# Patient Record
Sex: MALE | Race: BLACK OR AFRICAN AMERICAN
[De-identification: names, ages, dates, MRNs, and addresses within clinical notes are randomized per-mention and may not be internally consistent; named-entity substitution may affect disease eponyms.]

---

## 2017-02-03 NOTE — EMERGENCY ROOM REPORT
History of Present Illness


General


Chief Complaint:  Sore Throat


Source:  Medical Record





Present Illness


HPI


31-year-old male presents emergency department with nasal congestion, sore 

throat which is 5/10 in severity, burning in nature,  and intermittent cough 

x10 days.  Patient states that the cough just began yesterday.  Patient states 

that his nasal congestion is his main symptoms he also reports intermittent 

headaches due to increased pressure in the face.  Patient states has history of 

Asthma and HIV.  Patient states his last viral load was undetectable and he has 

a normal CD4 count.  Patient denies fever she reports chills states he is up-to-

date with vaccinations denies ill contacts or recent travel. Denies CP, 

Palpitations, LOC, AMS, dizziness, Changes in Vision, Sensation, paresthesias, 

or a sudden severe headache.


Allergies:  


Coded Allergies:  


     PENICILLINS (Unverified  Allergy, Unknown, 5/28/15)





Patient History


Past Medical History:  see triage record, HIV


Past Surgical History:  none


Pertinent Family History:  none


Immunizations:  UTD


Reviewed Nursing Documentation:  PMH: Agreed, PSxH: Agreed





Nursing Documentation-PMH


Past Medical History:  No History, Except For


Hx Asthma:  Yes





Review of Systems


All Other Systems:  negative except mentioned in HPI





Physical Exam





Vital Signs








  Date Time  Temp Pulse Resp B/P Pulse Ox O2 Delivery O2 Flow Rate FiO2


 


2/3/17 12:10 98.1 75 18 121/79 99 Room Air  








Sp02 EP Interpretation:  reviewed, normal


General Appearance:  no apparent distress, alert, GCS 15, non-toxic


Head:  normocephalic, atraumatic


Eyes:  bilateral eye PERRL, bilateral eye normal inspection


ENT:  hearing grossly normal, normal pharynx, no angioedema, normal voice, TMs 

+ canals normal, uvula midline, moist mucus membranes, nasal congestion, 

pharyngeal erythema, other - frontal and maxillary sinus TTP


Neck:  full range of motion, supple/symm/no masses


Respiratory:  chest non-tender, lungs clear, normal breath sounds, speaking 

full sentences


Cardiovascular #1:  regular rate, rhythm, no edema


Gastrointestinal:  normal bowel sounds, non tender, soft, no guarding, no 

rebound


Rectal:  deferred


Genitourinary:  normal inspection, no CVA tenderness


Musculoskeletal:  back normal, gait/station normal, normal range of motion, non-

tender, no calf tenderness


Neurologic:  alert, oriented x3, responsive, motor strength/tone normal, 

sensory intact, speech normal


Psychiatric:  judgement/insight normal, memory normal, mood/affect normal, no 

suicidal/homicidal ideation


Skin:  normal color, no rash, warm/dry, well hydrated


Lymphatic:  no adenopathy





Medical Decision Making


PA Attestation


Dr. Babcock  is my supervising Physician whom patient management has been 

discussed with.


Diagnostic Impression:  


 Primary Impression:  


 Acute sinusitis


 Qualified Codes:  J01.10 - Acute frontal sinusitis, unspecified


 Additional Impressions:  


 Acute sinus infection


 Qualified Codes:  J01.10 - Acute frontal sinusitis, unspecified


 Acute sinusitis with symptoms > 10 days


ER Course


**


**!* PT ALLERGIC TO PCN, therefore Amoxicillin was not prescribed due to 

allergy **!**





Pt. presents to the ED c/o nasal congestion, sore throat and intermittent cough 

x10 days.  Patient states that the cough just began yesterday





Ddx considered but are not limited to URI, pneumonia, PE, strep pharyngitis, 

meningitis, Sinusitis.





Vital signs: Pt. is afebrile, the remaining VS are WNL


H&PE are most consistent with  Sinusitis due to symptoms for more than 10 

days.. 





ORDERS: none required at this time, the diagnosis is clinical





ED INTERVENTIONS: None required at this time. 








--PT. EDUCATION: Discussed  rebound congestion with use of Affrin 





DISCHARGE: At this time pt. is stable for d/c to home. Will provide printed 

patient care instructions, and any necessary prescriptions. Care plan and 

follow up instructions have been discussed with the patient prior to discharge.





Last Vital Signs








  Date Time  Temp Pulse Resp B/P Pulse Ox O2 Delivery O2 Flow Rate FiO2


 


2/3/17 12:10 98.1 75 18 121/79 99 Room Air  








Disposition:  HOME, SELF-CARE


Condition:  Stable


Scripts


Albuterol Sulfate* (ALBUTEROL SULFATE MDI*) 8.5 Gm Hfa.aer.ad


2 PUFF INH Q3H, #1 INH 2 Refills


   Prov: Gilma Vargas.ATyesha         2/3/17 


Lidocaine HCl (Lidocaine HCl Viscous) 100 Ml Solution


15 ML PO QID Y for For Pain, #120 ML


   Prov: Gilma Vargas P.ATyesha         2/3/17 


Clindamycin Hcl (CLINDAMYCIN HCL) 300 Mg Capsule


300 MG ORAL TID for 7 Days, #21 CAP


   Prov: Gilma Vargas.A.         2/3/17 


Levofloxacin* (LEVAQUIN*) 500 Mg Tablet


500 MG ORAL DAILY for 5 Days, #5 TAB


   Prov: Gilma Vargas         2/3/17


Referrals:  


NON PHYSICIAN (PCP)


Patient Instructions:  Sinusitis, Adult, Easy-to-Read, Sore Throat





Additional Instructions:  


Take medications as directed. 


Follow up with PCP in 3-5 days 


Return sooner to ED if new symptoms occur, or current symptoms become worse.











Gilma Vargas Feb 3, 2017 12:32

## 2017-02-27 NOTE — EMERGENCY ROOM REPORT
History of Present Illness


General


Chief Complaint:  Pain


Source:  Patient





Present Illness


HPI


The patient is a 31-year-old male presenting with right knee pain which began 4 

months prior.  Patient is unsure of why the pain began and denies any injury to 

the knee.  Pain is described as a 10 out of 10 dull ache it does not radiate 

from the knee.  Pain worse with touch and movement.  Patient has seen his 

primary care doctor who has scheduled an MRI next month.  Patient has not had 

an x-ray done. Pt denies any other symptoms including rash, F, chills, numbness/

tingling


Allergies:  


Coded Allergies:  


     PENICILLINS (Unverified  Allergy, Unknown, 5/28/15)





Patient History


Past Medical History:  see triage record


Pertinent Family History:  none


Reviewed Nursing Documentation:  PMH: Agreed, PSxH: Agreed





Nursing Documentation-PMH


Hx Asthma:  Yes





Review of Systems


All Other Systems:  negative except mentioned in HPI





Physical Exam





Vital Signs








  Date Time  Temp Pulse Resp B/P Pulse Ox O2 Delivery O2 Flow Rate FiO2


 


2/27/17 16:20 98.1 72 16 133/79 100 Room Air  








Sp02 EP Interpretation:  reviewed, normal


General Appearance:  no apparent distress, alert, GCS 15, non-toxic


Head:  normocephalic, atraumatic


Eyes:  bilateral eye PERRL, bilateral eye normal inspection


Musculoskeletal:  normal inspection, gait/station normal, normal range of motion

, tender - TTP over anterior knee


Neurologic:  alert, oriented x3, responsive, motor strength/tone normal, 

sensory intact, speech normal


Psychiatric:  judgement/insight normal, memory normal, mood/affect normal, no 

suicidal/homicidal ideation


Reflexes:  3+ bicep (R), 3+ bicep (L), 3+ tricep (R), 3+ tricep (L), 3+ knee (R)

, 3+ knee (L)


Skin:  normal color, no rash, warm/dry, well hydrated


Lymphatic:  no adenopathy





Procedures


Splinting


Splinting :  


   Consent:  Verbal


   Location:  R knee


   Pre-Made Type:  ACE wrap


   Splint:  knee


   Pre-Proc Neuro Vasc Exam:  normal


   Post-Proc Neuro Vasc Exam:  normal


   Patient Tolerated:  Well


   Complications:  None





Medical Decision Making


PA Attestation


Dr. Membreno is my supervising physician. Patient management was discussed with 

my supervising physician


Diagnostic Impression:  


 Primary Impression:  


 Strain of right knee


ER Course


The patient is a 31-year-old male presenting with right knee pain which began 4 

months prior





Ddx considered include but not limited to sprain/strain, fracture, contusion





PE: vitals WNL. NAD


R knee: Full AROM. No edema. No erythema. No laxity. 


TTP over the anterior joint line. 


SILT





X-ray of the knee is unremarkable.  ACE wrap is placed over the right knee the 

patient is provided crutches.


Pt is given motrin for pain. 


ER precautions given and pt will FU with PMD and have MRI done.


Other X-Ray Diagnostic Results


Other X-Ray Diagnostic Results :  


   X-Ray Ordered:  R knee


   Date:  Feb 27, 2017


   EP Interpretation:  Yes


   Findings:  no fractures, no dislocation, no soft tissue swelling


   Number of Views:  3


   PA Scribe Text


I am acting as scribe for my supervising physician. My supervising physician's 

interpretation of the R knee xrays are there are no fractures, dislocations or 

soft tissue swelling.





Last Vital Signs








  Date Time  Temp Pulse Resp B/P Pulse Ox O2 Delivery O2 Flow Rate FiO2


 


2/27/17 17:11 98.1  16 133/79 100 Room Air  


 


2/27/17 16:20  72      








Status:  improved


Disposition:  HOME, SELF-CARE


Condition:  Improved


Scripts


Ibuprofen* (MOTRIN*) 600 Mg Tablet


600 MG ORAL Q8H Y for For Pain, #30 TAB 0 Refills


   Prov: RIRI ACKERMAN         2/27/17


Referrals:  


[facility] (PCP)


Patient Instructions:  Knee Pain





Additional Instructions:  


I discussed my findings with the patient. All questions and concerns have been 

answered. Treatment and medication compliance have been addressed. I advised 

the patient that they need to follow up with PMD in 3-5 days. Return to ED if 

pain remains or worsens, numbness or tingling occurs, new rash is noticed, 

fever is noticed, or if needed for any reason. Patient verbalized understanding 

of discharge instructions.











RIRI ACKERMAN Feb 27, 2017 19:43

## 2017-02-28 NOTE — DIAGNOSTIC IMAGING REPORT
Indication: Pain



3 views of the right knee were obtained.



Findings:  No acute fracture, malalignment, or joint effusion are identified. Joint

space is relatively well-maintained. Bone mineralization is within normal limits 

for

age.



Impression:  Negative exam

## 2017-04-19 NOTE — EMERGENCY ROOM REPORT
History of Present Illness


General


Chief Complaint:  Diarrhea


Source:  Patient





Present Illness


HPI


31YOM with 3-4 days 3x daily watery diarrhea asssoc with stomach "cramps."  No 

abd pain, fever/chills, nausea/vomiting, sick contacts, foreign travel.  HIV+, 

on HAART. Undetectable viral load.  Been taking VERY high doses of ibuprofen 

for chronic right knee pain - up to 2-3 800mg tablets ibuprofen THREE times a 

day.  Was seen here prior for right knee pain, xray was negative. Pain worse 

when walking, feels it "under knee."  Had MRI last week but doesnt know result 

yet.  Denies trauma to knee, swelling, redness, fever/chills.


Allergies:  


Coded Allergies:  


     PENICILLINS (Unverified  Allergy, Unknown, 5/28/15)





Patient History


Past Medical History:  HIV


Past Surgical History:  none


Pertinent Family History:  none


Social History:  Denies: alcohol use, drug use, smoking


Immunizations:  UTD


Reviewed Nursing Documentation:  PMH: Agreed, PSxH: Agreed





Nursing Documentation-PMH


Past Medical History:  No History, Except For


Hx Asthma:  Yes





Review of Systems


All Other Systems:  negative except mentioned in HPI





Physical Exam





Vital Signs








  Date Time  Temp Pulse Resp B/P Pulse Ox O2 Delivery O2 Flow Rate FiO2


 


4/19/17 08:31 98.2 94 16 119/71 98 Room Air  








Sp02 EP Interpretation:  reviewed, normal


General Appearance:  normal inspection, well appearing, no apparent distress, 

alert, GCS 15, non-toxic


Head:  normocephalic, atraumatic


Eyes:  bilateral eye EOMI, bilateral eye PERRL


ENT:  normal ENT inspection, hearing grossly normal, normal voice


Neck:  normal inspection, full range of motion, supple, no bony tend


Respiratory:  normal inspection, lungs clear, normal breath sounds, no 

respiratory distress, no retraction, no wheezing


Cardiovascular #1:  regular rate, rhythm, no edema


Gastrointestinal:  normal inspection, normal bowel sounds, non tender, soft, no 

guarding, no hernia


Genitourinary:  no CVA tenderness


Musculoskeletal:  normal inspection, back normal, normal range of motion, Walker'

s Sign negative, other - right knee: no swelling, no erythema.  No joint laxity


Neurologic:  normal inspection, alert, oriented x3, responsive, CNs III-XII nml 

as tested, motor strength/tone normal, speech normal


Psychiatric:  normal inspection, judgement/insight normal, mood/affect normal


Skin:  normal inspection





Medical Decision Making


Diagnostic Impression:  


 Primary Impression:  


 Diarrhea


 Qualified Codes:  R19.7 - Diarrhea, unspecified


 Additional Impression:  


 Right knee pain


 Qualified Codes:  M25.561 - Pain in right knee; G89.29 - Other chronic pain


ER Course


Diarrhea and cramps likely gastritis from overdosing of ibuprofen for chronic 

right knee pain


Abd soft, NT/ND - unlikely perforation.


No blood in stool, unlikely upper GI bleed


No sign of septic infection right knee


Atraumatic - no joint laxity


Joint pain possibly related to side effect of antiretrovirals


Advised STOP Ibuprofen


Rx Pepcid for  1 week


Rx norco for knee pain


PMD followup for MRI result, consider switching HAART meds given joint pain





Last Vital Signs








  Date Time  Temp Pulse Resp B/P Pulse Ox O2 Delivery O2 Flow Rate FiO2


 


4/19/17 09:00 98.2 94 16 119/71 98 Room Air  








Status:  improved


Disposition:  HOME, SELF-CARE


Condition:  Improved


Scripts


Famotidine (PEPCID) 20 Mg Tablet


20 MG ORAL BID for 7 Days, #14 TAB 0 Refills


   Prov: LINDSEY CABRALES M.D.         4/19/17 


Hydrocodone Bit/Acetaminophen 5-325* (NORCO 5-325*) 1 Each Tablet


1 TAB ORAL Q8HR Y for knee pain, #20 TAB 0 Refills


   Prov: LINDSEY CARBALES M.D.         4/19/17


Patient Instructions:  Gastritis, Adult





Additional Instructions:  


- STOP ibuprofen


- Take pepcid twice daily for 1 week


- Take Norco as needed for knee pain


- Follow up with your doctor for result of MRI


- Discuss with doctor possibility that your medications are contributing to 

your knee pain











LINDSEY CABRALES M.D. Apr 19, 2017 09:29

## 2017-05-19 NOTE — EMERGENCY ROOM REPORT
History of Present Illness


General


Chief Complaint:  Flu Like Symptoms


Source:  Patient





Present Illness


HPI


Patient present with complaints of sore throat


Ongoing for the past 2 days


Increased pain with swallowing


Denies any fevers





Pain is 4/10 sharp





Denies any posterior neck pain denies any headache or visual changes denies any 

chest pain or shortness of breath he also has a mild runny nose





Patient appeared to be limping in triage questions further patient reported 

that he had chronic right knee pain for which she was getting worked up as an 

outpatient, and has had MRI


Allergies:  


Coded Allergies:  


     PENICILLINS (Unverified  Allergy, Unknown, 5/28/15)





Patient History


Past Medical History:  see triage record


Pertinent Family History:  none


Reviewed Nursing Documentation:  PMH: Agreed, PSxH: Agreed





Nursing Documentation-PMH


Hx Cardiac Problems:  No


Hx Hypertension:  No


Hx Pacemaker:  No


Hx Asthma:  Yes


Hx COPD:  No


Hx Diabetes:  No


Hx Cancer:  No


Hx Gastrointestinal Problems:  No


Hx Dialysis:  No


History Of Psychiatric Problem:  No


Hx Cerebrovascular Accident:  No


Hx Seizures:  No





Review of Systems


All Other Systems:  negative except mentioned in HPI





Physical Exam





Vital Signs








  Date Time  Temp Pulse Resp B/P Pulse Ox O2 Delivery O2 Flow Rate FiO2


 


5/18/17 07:39 98.1 78 16 120/80 99 Room Air  








Sp02 EP Interpretation:  reviewed, normal


General Appearance:  well appearing, no apparent distress


Head:  normocephalic, atraumatic


Eyes:  bilateral eye EOMI, bilateral eye PERRL


ENT:  no angioedema, normal voice, pharyngeal erythema


Neck:  supple, thyroid normal, no meningismus


Respiratory:  lungs clear, normal breath sounds


Cardiovascular #1:  regular rate, rhythm


Gastrointestinal:  soft, no mass


Neurologic:  alert, oriented x3, responsive


Skin:  no rash


Lymphatic:  no adenopathy





Medical Decision Making


Diagnostic Impression:  


 Primary Impression:  


 Pharyngitis


ER Course


Patient has clinical findings the pharyngitis


Was placed on oral antibiotics





Patient's right knee discomfort is not further addressed and has been worked up 

as an outpatient





Last Vital Signs








  Date Time  Temp Pulse Resp B/P Pulse Ox O2 Delivery O2 Flow Rate FiO2


 


5/18/17 07:55 98.1  16 120/80 99 Room Air  


 


5/18/17 07:45  78      








Status:  unchanged


Disposition:  HOME, SELF-CARE


Condition:  Stable


Scripts


Ibuprofen* (MOTRIN*) 600 Mg Tablet


600 MG ORAL Q8H Y for For Pain, #20 TAB 0 Refills


   Prov: JAMEHDOR,ALI D.O.         5/18/17 


Azithromycin* (ZITHROMAX*) 250 Mg Tablet


500 MG ORAL ONCE for 7 Days, TAB 0 Refills


   Prov: UJVENAL GARCIA D.O.         5/18/17


Referrals:  


[facility] (PCP)


Patient Instructions:  Pharyngitis, Easy-to-Read





Additional Instructions:  


Patient is provided with the discharge instructions notified to follow up with 

primary doctor in the next 2-3 days otherwise return to the er with any 

worsening symptoms.


Please note that this report is being documented using [employer] technology.  This 

can lead to erroneous entry secondary to incorrect interpretation by the 

dictating instrument.











JUVENAL GARCIA D.O. May 19, 2017 07:17

## 2017-07-23 NOTE — EMERGENCY ROOM REPORT
History of Present Illness


General


Chief Complaint:  Sore Throat


Source:  Patient





Present Illness


HPI


32 YO Male presents to the ED c/o : sore throat, tonsillar swelling, and nasal 

congestion x  1week.  denies fevers or cough. denies neck pain or stiffness. 

pain is 9/10 in severity exacerbated with swallowing, described as razor blades 

on the back of the throat. pt. has been doing warm salt gargles with no relief.

  denies rashes, abdominal pain, recent travel or ill contacts. Denies CP, 

Palpitations, LOC, AMS, dizziness, Changes in Vision, Sensation, paresthesias, 

or a sudden severe headache.


Allergies:  


Coded Allergies:  


     PENICILLINS (Unverified  Allergy, Unknown, 5/28/15)





Patient History


Past Medical History:  see triage record


Past Surgical History:  none


Pertinent Family History:  none


Immunizations:  UTD


Reviewed Nursing Documentation:  PMH: Agreed, PSxH: Agreed





Nursing Documentation-PMH


Hx Hypertension:  No


Hx Pacemaker:  No


Hx Asthma:  Yes


Hx COPD:  No


Hx Diabetes:  No


Hx Cancer:  No


Hx Gastrointestinal Problems:  No


Hx Dialysis:  No


Hx Cerebrovascular Accident:  No


Hx Seizures:  No





Review of Systems


All Other Systems:  negative except mentioned in HPI





Physical Exam





Vital Signs








  Date Time  Temp Pulse Resp B/P Pulse Ox O2 Delivery O2 Flow Rate FiO2


 


7/23/17 12:28 97.5 95 16 128/67 97 Room Air  








Sp02 EP Interpretation:  reviewed, normal


General Appearance:  no apparent distress, alert, GCS 15, non-toxic


Head:  normocephalic, atraumatic


Eyes:  bilateral eye PERRL, bilateral eye normal inspection


ENT:  hearing grossly normal, normal pharynx, no angioedema, normal voice, TMs 

+ canals normal, uvula midline, nasal congestion, tonsillar swelling, 

pharyngeal erythema


Neck:  full range of motion, no meningismus, no bony tend, supple/symm/no masses


Respiratory:  lungs clear, normal breath sounds, speaking full sentences


Cardiovascular #1:  regular rate, rhythm, no edema


Rectal:  deferred


Musculoskeletal:  back normal, gait/station normal, normal range of motion, non-

tender


Neurologic:  alert, oriented x3, responsive, motor strength/tone normal, 

sensory intact, speech normal


Psychiatric:  judgement/insight normal, memory normal, mood/affect normal


Skin:  normal color, no rash, warm/dry, well hydrated


Lymphatic:  no adenopathy





Medical Decision Making


PA Attestation


Dr. Babcock  is my supervising Physician whom patient management has been 

discussed with.


Diagnostic Impression:  


 Primary Impression:  


 Pharyngitis, acute


 Qualified Codes:  J02.9 - Acute pharyngitis, unspecified


ER Course


Pt. presents to the ED c/o : sore throat, tonsillar swelling, and nasal 

congestion x  1week.  denies fevers or cough. denies neck pain or stiffness.  


Ddx considered but are not limited to: pharyngitis, strep, PTA, ludwigs angina, 

URI 


Vital signs: are WNL, pt. is afebrile 


H&PE are most consistent with: pharyngitis presumed strep. 


ORDERS: None required at this time as the diagnosis is clinical 


ED INTERVENTIONS: none required at this time. 


DISCHARGE: At this time pt. is stable for d/c to home. Will provide printed 

patient care instructions, and any necessary prescriptions. Care plan and 

follow up instructions have been discussed with the patient prior to discharge.





Last Vital Signs








  Date Time  Temp Pulse Resp B/P Pulse Ox O2 Delivery O2 Flow Rate FiO2


 


7/23/17 12:28 97.5 95 16 128/67 97 Room Air  








Disposition:  HOME, SELF-CARE


Condition:  Stable


Scripts


Acetaminophen* (TYLENOL EXTRA STRENGTH*) 500 Mg Tablet


500 MG ORAL Q6H, #20 TAB 0 Refills


   Prov: Gilma Vargas         7/23/17 


Pseudoephedrine Hcl* (NEXAFED*) 30 Mg Tablet


30 MG ORAL Q6H Y for congestion, #20 TAB


   Prov: Gilma Vargas.ATyesha         7/23/17 


Azithromycin* (ZITHROMAX*) 250 Mg Tablet


250 MG ORAL DAILY for 6 Days, #6 TAB 0 Refills


   Prov: Gilma VargasATyesha         7/23/17 


Lidocaine HCl 2% Viscous (Lidocaine HCl 2% Viscous) 100 Ml Solution


15 ML ORAL QID, #118 ML


   Prov: Gilma Vargas         7/23/17


Patient Instructions:  Pharyngitis, Easy-to-Read





Additional Instructions:  


Take medications as directed. 


 ** Follow up with a Primary Care Provider in 3-5 days, even if your symptoms 

have resolved. ** 


--Please review list of primary care clinics, if you do not already have a 

primary care provider





Return sooner to ED if new symptoms occur, or current symptoms become worse. 








- Please note that this Emergency Department Report was dictated using [employer] technology software, occasionally this can lead to 

erroneous entry secondary to interpretation by the dictation equipment.











Gilma Vargas Jul 23, 2017 12:59

## 2017-09-07 NOTE — EMERGENCY ROOM REPORT
History of Present Illness


General


Chief Complaint:  Abdominal Pain


Source:  Patient, Medical Record





Present Illness


HPI


32 yo male with no sig pmhx p/w one week of nausea vomiting and diarrhea


Patient states vomiting started 6 days ago which has since subsided for the 

last 3 days, 


Patient complains of mild abdominal pain generalized, non radiating, crampy in 

nature, intermittent. No relieving or exacerbating factors. Severity is mild.


Patient also complains of ~3-4 episodes of watery non bloody diarrhea for the 

past 2 days


Denies fever, chills. 


No hx of abdominal surgeries.


No hx of endoscopies/colonoscopies.


Denies any recent antibiotic use, any recent travel or sick contacts


Allergies:  


Coded Allergies:  


     PENICILLINS (Unverified  Allergy, Unknown, 5/28/15)





Patient History


Past Medical History:  see triage record


Past Surgical History:  none


Pertinent Family History:  none


Reviewed Nursing Documentation:  PMH: Agreed, PSxH: Agreed





Nursing Documentation-PMH


Past Medical History:  No History, Except For


Hx Hypertension:  No


Hx Pacemaker:  No


Hx Asthma:  Yes


Hx COPD:  No


Hx Diabetes:  No


Hx Cancer:  No


Hx Gastrointestinal Problems:  No


Hx Dialysis:  No


History Of Psychiatric Problem:  Yes - Depression


Hx Cerebrovascular Accident:  No


Hx Seizures:  No





Review of Systems


All Other Systems:  negative except mentioned in HPI





Physical Exam





Vital Signs








  Date Time  Temp Pulse Resp B/P (MAP) Pulse Ox O2 Delivery O2 Flow Rate FiO2


 


9/7/17 09:53 97.7 81 14 120/78 99 Room Air  








Sp02 EP Interpretation:  reviewed, normal


General Appearance:  normal inspection, well appearing, no apparent distress, 

alert, GCS 15, non-toxic, other - Well-hydrated, not in distress, conversing 

appropriately


Head:  normocephalic, atraumatic


Eyes:  bilateral eye normal inspection, bilateral eye PERRL, bilateral eye EOMI


ENT:  normal ENT inspection, normal pharynx, normal voice, moist mucus membranes


Neck:  normal inspection, full range of motion, supple


Respiratory:  normal inspection, lungs clear, normal breath sounds, no 

respiratory distress, no retraction, no wheezing, speaking full sentences, 

chest symmetrical


Cardiovascular #1:  normal inspection, regular rate, rhythm, no edema, normal 

capillary refill


Cardiovascular #2:  2+ radial (R), 2+ radial (L)


Gastrointestinal:  normal inspection, non tender, soft, non-distended, no 

guarding


Genitourinary:  no CVA tenderness


Musculoskeletal:  normal inspection, back normal, normal range of motion, non-

tender


Neurologic:  normal inspection, alert, oriented x3, responsive, motor strength/

tone normal, sensory intact, normal gait, speech normal


Psychiatric:  normal inspection, judgement/insight normal, memory normal


Skin:  normal inspection, normal color, no rash, warm/dry, well hydrated, 

normal turgor





Medical Decision Making


Diagnostic Impression:  


 Primary Impression:  


 Nausea, vomiting, and diarrhea


ER Course


32 yo male with nausea and vomiting which has now resolved now with diarrhea


Differential Diagnosis:


Gastritis, gastroenteritis, cholecystitis, appendicitis, diverticulitis, SBO, 

mesenteric ischemia, cardiac, UTI/pyelo


At this time abdomen is soft nontender, not likely to have acute intra-

abdominal surgical pathology, will hold CT for now. 


due to history and physical exam gastroenteritis is most likely in this patient





Plan:


Basic labs, ua, Zofran and IV fluids








ER course:


Patient has remained stable during ED stay.


No episodes of nausea vomiting or diarrhea.  Repeat abdominal exam is benign.





Disposition:


Patient is to be discharged to home.


Patient is instructed to follow up with their primary care doctor within 5 

days. 


Strict return precautions discussed with patient such as fever, chills, 

worsening/severe pain, nausea, vomiting, which may indicate severe illness. 

Patient verbalizes understanding and agrees with plan. 





Please note that this Emergency Department Report was dictated using [employer] technology software, occasionally this can lead to 

erroneous entry secondary to interpretation by the dictation equipment





Laboratory Tests








Test


  9/7/17


10:26 9/7/17


10:30


 


Urine Color Pale yellow   


 


Urine Appearance Clear   


 


Urine pH 8 (4.5-8.0)   


 


Urine Specific Gravity


  1.010


(1.005-1.035) 


 


 


Urine Protein


  Negative


(NEGATIVE) 


 


 


Urine Glucose (UA)


  Negative


(NEGATIVE) 


 


 


Urine Ketones


  Negative


(NEGATIVE) 


 


 


Urine Occult Blood


  Negative


(NEGATIVE) 


 


 


Urine Nitrite


  Negative


(NEGATIVE) 


 


 


Urine Bilirubin


  Negative


(NEGATIVE) 


 


 


Urine Urobilinogen


  Normal MG/DL


(0.0-1.0) 


 


 


Urine Leukocyte Esterase


  Negative


(NEGATIVE) 


 


 


White Blood Count


  


  5.5 K/UL


(4.8-10.8)


 


Red Blood Count


  


  5.16 M/UL


(4.70-6.10)


 


Hemoglobin


  


  14.5 G/DL


(14.2-18.0)


 


Hematocrit


  


  45.6 %


(42.0-52.0)


 


Mean Corpuscular Volume  88 FL (80-99)  


 


Mean Corpuscular Hemoglobin


  


  28.1 PG


(27.0-31.0)


 


Mean Corpuscular Hemoglobin


Concent 


  31.8 G/DL


(32.0-36.0)  L


 


Red Cell Distribution Width


  


  12.5 %


(11.6-14.8)


 


Platelet Count


  


  151 K/UL


(150-450)


 


Mean Platelet Volume


  


  10.3 FL


(6.5-10.1)  H


 


Neutrophils (%) (Auto)


  


  69.3 %


(45.0-75.0)


 


Lymphocytes (%) (Auto)


  


  21.5 %


(20.0-45.0)


 


Monocytes (%) (Auto)


  


  8.4 %


(1.0-10.0)


 


Eosinophils (%) (Auto)


  


  0.0 %


(0.0-3.0)


 


Basophils (%) (Auto)


  


  0.9 %


(0.0-2.0)


 


Sodium Level


  


  137 mEQ/L


(135-145)


 


Potassium Level


  


  3.9 mEQ/L


(3.4-4.9)


 


Chloride Level


  


  101 mEQ/L


()


 


Carbon Dioxide Level


  


  26 mEQ/L


(20-30)


 


Anion Gap  10 (5-15)  


 


Blood Urea Nitrogen


  


  10 mg/dL


(7-23)


 


Creatinine


  


  0.8 mg/dL


(0.7-1.2)


 


Estimate Glomerular


Filtration Rate 


  > 60 mL/min


(>60)


 


Glucose Level


  


  97 mg/dL


()


 


Calcium Level


  


  9.5 mg/dL


(8.6-10.2)


 


Total Bilirubin


  


  0.3 mg/dL


(0.0-1.2)


 


Aspartate Amino Transferase


(AST) 


  16 U/L (5-40)  


 


 


Alanine Aminotransferase (ALT)  13 U/L (3-41)  


 


Alkaline Phosphatase


  


  61 U/L


()


 


Total Protein


  


  7.4 g/dL


(6.6-8.7)


 


Albumin


  


  4.3 g/dL


(3.5-5.2)


 


Globulin  3.1 g/dL  


 


Albumin/Globulin Ratio  1.3 (1.0-2.7)  


 


Lipase  21 U/L (< 60)  











Last Vital Signs








  Date Time  Temp Pulse Resp B/P (MAP) Pulse Ox O2 Delivery O2 Flow Rate FiO2


 


9/7/17 12:23 97.2 77 16 123/87 99 Room Air  








Disposition:  HOME, SELF-CARE


Condition:  Improved


Departure Forms:  Return to Work   Return to Work in (Days):  2


Patient Instructions:  Viral Gastroenteritis, Adult





Additional Instructions:  


Please follow up with your primary care doctor within 3 days. 





Please come back to the emergency room if you are having worsening abdominal 

pain, chest pain, shortness of breath, intractable nausea or vomiting











Fan Ragland M.D. Sep 7, 2017 14:57

## 2017-09-28 NOTE — EMERGENCY ROOM REPORT
History of Present Illness


General


Chief Complaint:  General Complaint


Source:  Patient





Present Illness


HPI


 33 YO Male presents to the ED c/o Sore throat with 9/10 in severity painful 

Oral lesions on his inner lip, roof of the mouth, and inner cheeks x 3 days. 

Denies fevers, chills, cough, neck pain or stiffness. Denies HA. Pt. is UTD 

with vaccinations, denies ill contacts or recent travel.  Pt has hx of HIV , 

takes anti-retrovirals. Denies swelling of the lips or tongue, SOB, difficulty 

breathing, rash, or lesions elsewhere on the body. denies blisters or new 

medications.Denies CP, Palpitations, LOC, AMS, dizziness, Changes in Vision, 

Sensation, paresthesias, or a sudden severe headache.


Allergies:  


Coded Allergies:  


     PENICILLINS (Unverified  Allergy, Unknown, 5/28/15)





Patient History


Past Medical History:  see triage record


Past Surgical History:  none


Pertinent Family History:  none


Immunizations:  UTD


Reviewed Nursing Documentation:  PMH: Agreed, PSxH: Agreed





Nursing Documentation-PMH


Past Medical History:  No History, Except For


Hx Hypertension:  No


Hx Pacemaker:  No


Hx Asthma:  Yes


Hx COPD:  No


Hx Diabetes:  No


Hx Cancer:  No


Hx Gastrointestinal Problems:  No


Hx Dialysis:  No


Hx Cerebrovascular Accident:  No


Hx Seizures:  No





Review of Systems


All Other Systems:  negative except mentioned in HPI





Physical Exam





Vital Signs








  Date Time  Temp Pulse Resp B/P (MAP) Pulse Ox O2 Delivery O2 Flow Rate FiO2


 


9/28/17 18:20 97.7 86 18 140/91 99 Room Air  








Sp02 EP Interpretation:  reviewed, normal


General Appearance:  no apparent distress, alert, GCS 15, non-toxic


Head:  normocephalic, atraumatic


Eyes:  bilateral eye normal inspection, bilateral eye PERRL


ENT:  hearing grossly normal, normal pharynx, no angioedema, normal voice, TMs 

+ canals normal, uvula midline, moist mucus membranes, other - superficial 

ulcers noted on the roof of the mouth, inner lower lip, and bilateral buccal 

surfaces of the cheeks. no tonsillar swelling, pharyngeal erythema or exudates.


Neck:  full range of motion, no meningismus


Respiratory:  lungs clear, normal breath sounds, speaking full sentences


Cardiovascular #1:  regular rate, rhythm, normal capillary refill


Rectal:  deferred


Genitourinary:  normal inspection, no CVA tenderness


Musculoskeletal:  back normal, gait/station normal, normal range of motion, non-

tender


Neurologic:  alert, oriented x3, responsive, motor strength/tone normal, 

sensory intact, speech normal


Psychiatric:  judgement/insight normal, memory normal, mood/affect normal


Skin:  normal color, no rash, warm/dry, well hydrated


Lymphatic:  no adenopathy





Medical Decision Making


PA Attestation


Dr. Levi  is my supervising Physician whom patient management has been 

discussed with.


Diagnostic Impression:  


 Primary Impression:  


 Viral sore throat


 Additional Impression:  


 Canker sores oral


ER Course


 33 YO Male presents to the ED c/o Sore throat with 9/10 in severity painful 

Oral lesions on his inner lip, roof of the mouth, and inner cheeks x 3 days. 

Denies fevers, chills, cough, neck pain or stiffness. Denies HA. Pt. is UTD 

with vaccinations, denies ill contacts or recent travel.  Pt has hx of HIV , 

takes anti-retrovirals. Denies swelling of the lips or tongue, SOB, difficulty 

breathing, rash, or lesions elsewhere on the body. denies blisters or new 

medications.Denies CP, Palpitations, LOC, AMS, dizziness, Changes in Vision, 

Sensation, paresthesias, or a sudden severe headache.





Ddx considered but are not limited to: pharyngitis, strep, PTA, ludwigs angina, 

URI, candidiasis, hair leukoplakia just to name a few. 


Vital signs: are WNL, pt. is afebrile 


H&PE are most consistent with: canker sores most likely of viral etiology. 





ORDERS: None required at this time as the diagnosis is clinical 





ED INTERVENTIONS: none required at this time. 





DISCHARGE: At this time pt. is stable for d/c to home. Will provide printed 

patient care instructions, and any necessary prescriptions. Care plan and 

follow up instructions have been discussed with the patient prior to discharge.





Last Vital Signs








  Date Time  Temp Pulse Resp B/P (MAP) Pulse Ox O2 Delivery O2 Flow Rate FiO2


 


9/28/17 18:54 97.7  18 140/91 99 Room Air  


 


9/28/17 18:20  86      








Disposition:  HOME, SELF-CARE


Condition:  Stable


Scripts


Acyclovir* (ZOVIRAX*) 800 Mg Tablet


800 MG ORAL THREE TIMES A DAY for 5 Days, #15 CAP 0 Refills


   Prov: Gilma Vargas         9/28/17 


Lidocaine HCl 2% Viscous (Lidocaine HCl 2% Viscous) 100 Ml Solution


10 ML ORAL QID, #118 ML


   Prov: Gilma Vargas         9/28/17


Patient Instructions:  Canker Sores, Pharyngitis, Easy-to-Read





Additional Instructions:  


Take medications as directed. 


 ** Follow up with a Primary Care Provider in 3-5 days, even if your symptoms 

have resolved. ** 


--Please review list of primary care clinics, if you do not already have a 

primary care provider





Return sooner to ED if new symptoms occur, or current symptoms become worse. 








- Please note that this Emergency Department Report was dictated using [employer] technology software, occasionally this can lead to 

erroneous entry secondary to interpretation by the dictation equipment.











Gilma Vargas Sep 28, 2017 19:02

## 2017-10-27 NOTE — EMERGENCY ROOM REPORT
History of Present Illness


General


Chief Complaint:  Diarrhea


Source:  Patient





Present Illness


HPI


32-year-old male history of asthma presenting with diarrhea for 4 days


Denies abdominal pain


Denies fever chills, about 3-4 episodes of watery nonbloody diarrhea today


No hx of abdominal surgeries.


No hx of endoscopies/colonoscopies.


Patient states that this has happened in the past, no recent travel no sick 

contacts no recent antibiotic use


Allergies:  


Coded Allergies:  


     PENICILLINS (Unverified  Allergy, Unknown, 5/28/15)





Patient History


Past Medical History:  see triage record


Past Surgical History:  none


Pertinent Family History:  none


Reviewed Nursing Documentation:  PMH: Agreed, PSxH: Agreed





Nursing Documentation-PMH


Hx Hypertension:  No


Hx Pacemaker:  No


Hx Asthma:  Yes


Hx COPD:  No


Hx Diabetes:  No


Hx Cancer:  No


Hx Gastrointestinal Problems:  No


Hx Dialysis:  No


Hx Cerebrovascular Accident:  No


Hx Seizures:  No





Review of Systems


All Other Systems:  negative except mentioned in HPI





Physical Exam





Vital Signs








  Date Time  Temp Pulse Resp B/P (MAP) Pulse Ox O2 Delivery O2 Flow Rate FiO2


 


10/27/17 03:17 98.1 84 16 134/75 98 Room Air  








Sp02 EP Interpretation:  reviewed, normal


General Appearance:  normal inspection, well appearing, no apparent distress, 

alert, GCS 15, non-toxic


Head:  normocephalic, atraumatic


Eyes:  bilateral eye normal inspection, bilateral eye PERRL, bilateral eye EOMI


ENT:  normal ENT inspection, normal pharynx, normal voice, moist mucus membranes


Neck:  normal inspection, full range of motion, supple


Respiratory:  normal inspection, lungs clear, normal breath sounds, no 

respiratory distress, no retraction, no wheezing, speaking full sentences, 

chest symmetrical


Cardiovascular #1:  normal inspection, regular rate, rhythm, no edema, normal 

capillary refill


Cardiovascular #2:  2+ radial (R), 2+ radial (L)


Gastrointestinal:  normal inspection, non tender, soft, non-distended, no 

guarding


Genitourinary:  no CVA tenderness


Musculoskeletal:  normal inspection, back normal, normal range of motion, non-

tender


Neurologic:  normal inspection, alert, oriented x3, responsive, motor strength/

tone normal, sensory intact, normal gait, speech normal


Psychiatric:  normal inspection, judgement/insight normal, memory normal


Skin:  normal inspection, normal color, no rash, warm/dry, well hydrated, 

normal turgor





Medical Decision Making


Diagnostic Impression:  


 Primary Impression:  


 Diarrhea


ER Course


32-year-old male with diarrhea for 4 days





Differential Diagnosis:


Viral gastroenteritis, at this time abdomen is very soft nontender





Plan:


None emergency room, patient appears nontoxic, is able to tolerate by mouth, 

not dehydrated





ER course:


Patient has remained stable during ED stay.








Disposition:


Patient is to be discharged to home.


Patient is instructed to follow up with their primary care doctor within 5 

days. 


Strict return precautions discussed with patient such as fever, chills, 

worsening/severe abdominal pain, nausea, vomiting, black or bloody stools, 

which may indicate severe illness. Patient verbalizes understanding and agrees 

with plan. 





Please note that this Emergency Department Report was dictated using [employer] technology software, occasionally this can lead to 

erroneous entry secondary to interpretation by the dictation equipment





Last Vital Signs








  Date Time  Temp Pulse Resp B/P (MAP) Pulse Ox O2 Delivery O2 Flow Rate FiO2


 


10/27/17 03:20 98.1 84 16 134/75 98 Room Air  








Disposition:  HOME, SELF-CARE


Condition:  Stable


Scripts


Albuterol Sulfate* (ALBUTEROL SULFATE MDI*) 8.5 Gm Hfa.aer.ad


2 PUFF INH Q4H Y for cough/wheezing, #1 EA 0 Refills


   Prov: Fan Ragland M.D.         10/27/17


Patient Instructions:  Diarrhea, Adult











Fan Ragland M.D. Oct 27, 2017 04:06

## 2017-10-30 NOTE — EMERGENCY ROOM REPORT
History of Present Illness


General


Chief Complaint:  Nausea, Vomiting, and Diarrhea


Source:  Patient





Present Illness


HPI


32-year-old male presents ED for evaluation.  Patient states her approximately 

one week now is having cramping abdominal pain with diarrhea.  Patient was seen 

here 4 days ago and was given reassurance that this is viral and was subsequent 

discharge.  Patient states the symptoms have persisted and have gotten worse.  

Notes bloody diarrhea now. pain is cramping, 10/10, nonradiating. Notes some 

episodes of fevers and chills.  Afebrile in triage.  Patient notes history of 

HIV and is currently on medication.  Denies sick contacts or recent travel.  No 

other aggravating relieving factors.  Denies any other associated symptoms


Allergies:  


Coded Allergies:  


     PENICILLINS (Unverified  Allergy, Unknown, 5/28/15)





Patient History


Past Medical History:  asthma


Past Surgical History:  none


Pertinent Family History:  none


Social History:  Denies: smoking, alcohol use, drug use


Immunizations:  UTD


Reviewed Nursing Documentation:  PMH: Agreed, PSxH: Agreed





Nursing Documentation-PMH


Past Medical History:  No History, Except For


Hx Hypertension:  No


Hx Pacemaker:  No


Hx Asthma:  Yes


Hx COPD:  No


Hx Diabetes:  No


Hx Cancer:  No - HIV positive


Hx Gastrointestinal Problems:  No


Hx Dialysis:  No


Hx Cerebrovascular Accident:  No


Hx Seizures:  No





Review of Systems


All Other Systems:  negative except mentioned in HPI





Physical Exam





Vital Signs








  Date Time  Temp Pulse Resp B/P (MAP) Pulse Ox O2 Delivery O2 Flow Rate FiO2


 


10/30/17 07:25 98.4 117 20 105/68 95 Room Air  








Sp02 EP Interpretation:  reviewed, normal


General Appearance:  no apparent distress, alert, GCS 15, non-toxic


Head:  normocephalic, atraumatic


Eyes:  bilateral eye normal inspection, bilateral eye PERRL


ENT:  hearing grossly normal, normal pharynx, no angioedema, normal voice


Neck:  full range of motion, supple/symm/no masses


Respiratory:  chest non-tender, lungs clear, normal breath sounds, speaking 

full sentences


Cardiovascular #1:  regular rate, rhythm, no edema


Cardiovascular #2:  2+ carotid (R), 2+ carotid (L), 2+ radial (R), 2+ radial (L)

, 2+ dorsalis pedis (R), 2+ dorsalis pedis (L)


Gastrointestinal:  normal bowel sounds, non tender, soft, non-distended, no 

guarding, no rebound


Rectal:  deferred


Genitourinary:  normal inspection, no CVA tenderness


Musculoskeletal:  back normal, gait/station normal, normal range of motion, non-

tender


Neurologic:  alert, oriented x3, responsive, motor strength/tone normal, 

sensory intact, speech normal


Psychiatric:  judgement/insight normal, memory normal, mood/affect normal, no 

suicidal/homicidal ideation


Reflexes:  3+ bicep (R), 3+ bicep (L), 3+ tricep (R), 3+ tricep (L), 3+ knee (R)

, 3+ knee (L)


Skin:  normal color, no rash, warm/dry, well hydrated


Lymphatic:  no adenopathy





Medical Decision Making


Diagnostic Impression:  


 Primary Impression:  


 Colitis


ER Course


Hospital Course 


32-year-old M presents to ED with cramping abdominal pain with vomiting, 

diarrhea 





differential diagnosis: gastritis, SBO, cholecystits, gastroenteritis  





Clinical course


Patient placed on stretcher.  On cardiac monitor.  After initial history and 

physical I ordered labs, IV fluids, Zofran, GI cocktail, pepcid





Labs - no leukocytosis, electrolytes ok, LFTs normal





Upon reassessment, patient states pain has improved.  Given bloody diarrhea, 

persistence of symptoms and history of immunosuppression we will prescribe 

antibiotics





I feel this is a highly complex case requiring extensive working including EKG/

Rhythm strip, Xray/CT/US, Blood/urine lab work, repeat exams while in ED, and 

administration of strong opiates/narcotics for pain control, admission to 

hospital or close patient follow up.  





Diagnosis - colitis





Stable and discharged to home with prescriptions for Zantac, bentyl, flagyl, 

cipro.  Followup with PMD.  Return to ED if symptoms recur or worsen





Labs








Test


  10/30/17


07:50


 


White Blood Count


  8.3 K/UL


(4.8-10.8)


 


Red Blood Count


  5.66 M/UL


(4.70-6.10)


 


Hemoglobin


  16.4 G/DL


(14.2-18.0)


 


Hematocrit


  49.7 %


(42.0-52.0)


 


Mean Corpuscular Volume 88 FL (80-99) 


 


Mean Corpuscular Hemoglobin


  28.9 PG


(27.0-31.0)


 


Mean Corpuscular Hemoglobin


Concent 32.9 G/DL


(32.0-36.0)


 


Red Cell Distribution Width


  13.3 %


(11.6-14.8)


 


Platelet Count


  144 K/UL


(150-450)


 


Mean Platelet Volume


  9.6 FL


(6.5-10.1)


 


Neutrophils (%) (Auto)


  58.6 %


(45.0-75.0)


 


Lymphocytes (%) (Auto)


  26.8 %


(20.0-45.0)


 


Monocytes (%) (Auto)


  11.5 %


(1.0-10.0)


 


Eosinophils (%) (Auto)


  1.2 %


(0.0-3.0)


 


Basophils (%) (Auto)


  1.9 %


(0.0-2.0)


 


Sodium Level


  137 MMOL/L


(136-145)


 


Potassium Level


  3.7 MMOL/L


(3.5-5.1)


 


Chloride Level


  101 MMOL/L


()


 


Carbon Dioxide Level


  23 MMOL/L


(21-32)


 


Anion Gap


  13 mmol/L


(5-15)


 


Blood Urea Nitrogen


  15 mg/dL


(7-18)


 


Creatinine


  1.0 MG/DL


(0.55-1.30)


 


Estimat Glomerular Filtration


Rate > 60 mL/min


(>60)


 


Glucose Level


  108 MG/DL


()


 


Calcium Level


  9.6 MG/DL


(8.5-10.1)


 


Total Bilirubin


  0.6 MG/DL


(0.2-1.0)


 


Aspartate Amino Transf


(AST/SGOT) 22 U/L (15-37) 


 


 


Alanine Aminotransferase


(ALT/SGPT) 26 U/L (12-78) 


 


 


Alkaline Phosphatase


  54 U/L


()


 


Total Protein


  8.2 G/DL


(6.4-8.2)


 


Albumin


  3.7 G/DL


(3.4-5.0)


 


Globulin 4.5 g/dL 


 


Albumin/Globulin Ratio 0.8 (1.0-2.7) 


 


Lipase


  196 U/L


()











Last Vital Signs








  Date Time  Temp Pulse Resp B/P (MAP) Pulse Ox O2 Delivery O2 Flow Rate FiO2


 


10/30/17 07:25 98.4 117 20 105/68 95 Room Air  








Status:  improved


Disposition:  HOME, SELF-CARE


Condition:  Stable


Scripts


Ranitidine Hcl* (ZANTAC*) 150 Mg Tablet


150 MG ORAL DAILY, #30 TAB 0 Refills


   Prov: TIARA LAUREN M.D.         10/30/17 


Dicyclomine Hcl* (BENTYL*) 10 Mg Capsule


10 MG ORAL FOUR TIMES A DAY, #20 CAP


   Prov: TIARA LAUREN M.D.         10/30/17 


Metronidazole* (FLAGYL*) 500 Mg Tablet


500 MG ORAL THREE TIMES A DAY, #21 TAB


   Prov: TIARA LAUREN M.D.         10/30/17 


Ciprofloxacin Hcl* (CIPROFLOXACIN HCL*) 500 Mg Tablet


500 MG ORAL Q12H, #14 TAB 0 Refills


   Prov: TIARA LAUREN M.D.         10/30/17


Referrals:  


[facility],REFERRING (PCP)











TIARA LAUREN M.D. Oct 30, 2017 08:07

## 2017-10-30 NOTE — EMERGENCY ROOM REPORT
History of Present Illness


General


Chief Complaint:  Nausea, Vomiting, and Diarrhea


Source:  Patient





Present Illness


HPI


32-year-old male presents ED for evaluation.  Patient states her approximately 

one week now is having cramping abdominal pain with diarrhea.  Patient was seen 

here 4 days ago and was given reassurance that this is viral and was subsequent 

discharge.  Patient states the symptoms have persisted and have gotten worse.  

Notes bloody diarrhea now. pain is cramping, 10/10, nonradiating. Notes some 

episodes of fevers and chills.  Afebrile in triage.  Patient notes history of 

HIV and is currently on medication.  Denies sick contacts or recent travel.  No 

other aggravating relieving factors.  Denies any other associated symptoms


Allergies:  


Coded Allergies:  


     PENICILLINS (Unverified  Allergy, Unknown, 5/28/15)





Patient History


Past Medical History:  asthma


Past Surgical History:  none


Pertinent Family History:  none


Social History:  Denies: smoking, alcohol use, drug use


Immunizations:  UTD


Reviewed Nursing Documentation:  PMH: Agreed, PSxH: Agreed





Nursing Documentation-PMH


Past Medical History:  No History, Except For


Hx Hypertension:  No


Hx Pacemaker:  No


Hx Asthma:  Yes


Hx COPD:  No


Hx Diabetes:  No


Hx Cancer:  No - HIV positive


Hx Gastrointestinal Problems:  No


Hx Dialysis:  No


Hx Cerebrovascular Accident:  No


Hx Seizures:  No





Review of Systems


All Other Systems:  negative except mentioned in HPI





Physical Exam





Vital Signs








  Date Time  Temp Pulse Resp B/P (MAP) Pulse Ox O2 Delivery O2 Flow Rate FiO2


 


10/30/17 07:25 98.4 117 20 105/68 95 Room Air  








Sp02 EP Interpretation:  reviewed, normal


General Appearance:  no apparent distress, alert, GCS 15, non-toxic


Head:  normocephalic, atraumatic


Eyes:  bilateral eye normal inspection, bilateral eye PERRL


ENT:  hearing grossly normal, normal pharynx, no angioedema, normal voice


Neck:  full range of motion, supple/symm/no masses


Respiratory:  chest non-tender, lungs clear, normal breath sounds, speaking 

full sentences


Cardiovascular #1:  regular rate, rhythm, no edema


Cardiovascular #2:  2+ carotid (R), 2+ carotid (L), 2+ radial (R), 2+ radial (L)

, 2+ dorsalis pedis (R), 2+ dorsalis pedis (L)


Gastrointestinal:  normal bowel sounds, non tender, soft, non-distended, no 

guarding, no rebound


Rectal:  deferred


Genitourinary:  normal inspection, no CVA tenderness


Musculoskeletal:  back normal, gait/station normal, normal range of motion, non-

tender


Neurologic:  alert, oriented x3, responsive, motor strength/tone normal, 

sensory intact, speech normal


Psychiatric:  judgement/insight normal, memory normal, mood/affect normal, no 

suicidal/homicidal ideation


Reflexes:  3+ bicep (R), 3+ bicep (L), 3+ tricep (R), 3+ tricep (L), 3+ knee (R)

, 3+ knee (L)


Skin:  normal color, no rash, warm/dry, well hydrated


Lymphatic:  no adenopathy





Medical Decision Making


Diagnostic Impression:  


 Primary Impression:  


 Colitis


ER Course


Hospital Course 


32-year-old M presents to ED with cramping abdominal pain with vomiting, 

diarrhea 





differential diagnosis: gastritis, SBO, cholecystits, gastroenteritis  





Clinical course


Patient placed on stretcher.  On cardiac monitor.  After initial history and 

physical I ordered labs, IV fluids, Zofran, GI cocktail, pepcid





Labs - no leukocytosis, electrolytes ok, LFTs normal





Upon reassessment, patient states pain has improved.  Given bloody diarrhea, 

persistence of symptoms and history of immunosuppression we will prescribe 

antibiotics





I feel this is a highly complex case requiring extensive working including EKG/

Rhythm strip, Xray/CT/US, Blood/urine lab work, repeat exams while in ED, and 

administration of strong opiates/narcotics for pain control, admission to 

hospital or close patient follow up.  





Diagnosis - colitis





Stable and discharged to home with prescriptions for Zantac, bentyl, flagyl, 

cipro.  Followup with PMD.  Return to ED if symptoms recur or worsen





Labs








Test


  10/30/17


07:50


 


White Blood Count


  8.3 K/UL


(4.8-10.8)


 


Red Blood Count


  5.66 M/UL


(4.70-6.10)


 


Hemoglobin


  16.4 G/DL


(14.2-18.0)


 


Hematocrit


  49.7 %


(42.0-52.0)


 


Mean Corpuscular Volume 88 FL (80-99) 


 


Mean Corpuscular Hemoglobin


  28.9 PG


(27.0-31.0)


 


Mean Corpuscular Hemoglobin


Concent 32.9 G/DL


(32.0-36.0)


 


Red Cell Distribution Width


  13.3 %


(11.6-14.8)


 


Platelet Count


  144 K/UL


(150-450)


 


Mean Platelet Volume


  9.6 FL


(6.5-10.1)


 


Neutrophils (%) (Auto)


  58.6 %


(45.0-75.0)


 


Lymphocytes (%) (Auto)


  26.8 %


(20.0-45.0)


 


Monocytes (%) (Auto)


  11.5 %


(1.0-10.0)


 


Eosinophils (%) (Auto)


  1.2 %


(0.0-3.0)


 


Basophils (%) (Auto)


  1.9 %


(0.0-2.0)


 


Sodium Level


  137 MMOL/L


(136-145)


 


Potassium Level


  3.7 MMOL/L


(3.5-5.1)


 


Chloride Level


  101 MMOL/L


()


 


Carbon Dioxide Level


  23 MMOL/L


(21-32)


 


Anion Gap


  13 mmol/L


(5-15)


 


Blood Urea Nitrogen


  15 mg/dL


(7-18)


 


Creatinine


  1.0 MG/DL


(0.55-1.30)


 


Estimat Glomerular Filtration


Rate > 60 mL/min


(>60)


 


Glucose Level


  108 MG/DL


()


 


Calcium Level


  9.6 MG/DL


(8.5-10.1)


 


Total Bilirubin


  0.6 MG/DL


(0.2-1.0)


 


Aspartate Amino Transf


(AST/SGOT) 22 U/L (15-37) 


 


 


Alanine Aminotransferase


(ALT/SGPT) 26 U/L (12-78) 


 


 


Alkaline Phosphatase


  54 U/L


()


 


Total Protein


  8.2 G/DL


(6.4-8.2)


 


Albumin


  3.7 G/DL


(3.4-5.0)


 


Globulin 4.5 g/dL 


 


Albumin/Globulin Ratio 0.8 (1.0-2.7) 


 


Lipase


  196 U/L


()











Last Vital Signs








  Date Time  Temp Pulse Resp B/P (MAP) Pulse Ox O2 Delivery O2 Flow Rate FiO2


 


10/30/17 07:25 98.4 117 20 105/68 95 Room Air  








Status:  improved


Disposition:  HOME, SELF-CARE


Condition:  Stable


Scripts


Ranitidine Hcl* (ZANTAC*) 150 Mg Tablet


150 MG ORAL DAILY, #30 TAB 0 Refills


   Prov: TIARA LAUREN M.D.         10/30/17 


Dicyclomine Hcl* (BENTYL*) 10 Mg Capsule


10 MG ORAL FOUR TIMES A DAY, #20 CAP


   Prov: TIARA LAUREN M.D.         10/30/17 


Metronidazole* (FLAGYL*) 500 Mg Tablet


500 MG ORAL THREE TIMES A DAY, #21 TAB


   Prov: TIARA LAUREN M.D.         10/30/17 


Ciprofloxacin Hcl* (CIPROFLOXACIN HCL*) 500 Mg Tablet


500 MG ORAL Q12H, #14 TAB 0 Refills


   Prov: TIARA LAUREN M.D.         10/30/17


Referrals:  


[facility],REFERRING (PCP)











TIARA LAUREN M.D. Oct 30, 2017 08:07

## 2017-10-30 NOTE — EMERGENCY ROOM REPORT
History of Present Illness


General


Chief Complaint:  Nausea, Vomiting, and Diarrhea


Source:  Patient





Present Illness


HPI


32-year-old male presents ED for evaluation.  Patient states her approximately 

one week now is having cramping abdominal pain with diarrhea.  Patient was seen 

here 4 days ago and was given reassurance that this is viral and was subsequent 

discharge.  Patient states the symptoms have persisted and have gotten worse.  

Notes bloody diarrhea now. pain is cramping, 10/10, nonradiating. Notes some 

episodes of fevers and chills.  Afebrile in triage.  Patient notes history of 

HIV and is currently on medication.  Denies sick contacts or recent travel.  No 

other aggravating relieving factors.  Denies any other associated symptoms


Allergies:  


Coded Allergies:  


     PENICILLINS (Unverified  Allergy, Unknown, 5/28/15)





Patient History


Past Medical History:  asthma


Past Surgical History:  none


Pertinent Family History:  none


Social History:  Denies: smoking, alcohol use, drug use


Immunizations:  UTD


Reviewed Nursing Documentation:  PMH: Agreed, PSxH: Agreed





Nursing Documentation-PMH


Past Medical History:  No History, Except For


Hx Hypertension:  No


Hx Pacemaker:  No


Hx Asthma:  Yes


Hx COPD:  No


Hx Diabetes:  No


Hx Cancer:  No - HIV positive


Hx Gastrointestinal Problems:  No


Hx Dialysis:  No


Hx Cerebrovascular Accident:  No


Hx Seizures:  No





Review of Systems


All Other Systems:  negative except mentioned in HPI





Physical Exam





Vital Signs








  Date Time  Temp Pulse Resp B/P (MAP) Pulse Ox O2 Delivery O2 Flow Rate FiO2


 


10/30/17 07:25 98.4 117 20 105/68 95 Room Air  








Sp02 EP Interpretation:  reviewed, normal


General Appearance:  no apparent distress, alert, GCS 15, non-toxic


Head:  normocephalic, atraumatic


Eyes:  bilateral eye normal inspection, bilateral eye PERRL


ENT:  hearing grossly normal, normal pharynx, no angioedema, normal voice


Neck:  full range of motion, supple/symm/no masses


Respiratory:  chest non-tender, lungs clear, normal breath sounds, speaking 

full sentences


Cardiovascular #1:  regular rate, rhythm, no edema


Cardiovascular #2:  2+ carotid (R), 2+ carotid (L), 2+ radial (R), 2+ radial (L)

, 2+ dorsalis pedis (R), 2+ dorsalis pedis (L)


Gastrointestinal:  normal bowel sounds, non tender, soft, non-distended, no 

guarding, no rebound


Rectal:  deferred


Genitourinary:  normal inspection, no CVA tenderness


Musculoskeletal:  back normal, gait/station normal, normal range of motion, non-

tender


Neurologic:  alert, oriented x3, responsive, motor strength/tone normal, 

sensory intact, speech normal


Psychiatric:  judgement/insight normal, memory normal, mood/affect normal, no 

suicidal/homicidal ideation


Reflexes:  3+ bicep (R), 3+ bicep (L), 3+ tricep (R), 3+ tricep (L), 3+ knee (R)

, 3+ knee (L)


Skin:  normal color, no rash, warm/dry, well hydrated


Lymphatic:  no adenopathy





Medical Decision Making


Diagnostic Impression:  


 Primary Impression:  


 Colitis


ER Course


Hospital Course 


32-year-old M presents to ED with cramping abdominal pain with vomiting, 

diarrhea 





differential diagnosis: gastritis, SBO, cholecystits, gastroenteritis  





Clinical course


Patient placed on stretcher.  On cardiac monitor.  After initial history and 

physical I ordered labs, IV fluids, Zofran, GI cocktail, pepcid





Labs - no leukocytosis, electrolytes ok, LFTs normal





Upon reassessment, patient states pain has improved.  Given bloody diarrhea, 

persistence of symptoms and history of immunosuppression we will prescribe 

antibiotics





I feel this is a highly complex case requiring extensive working including EKG/

Rhythm strip, Xray/CT/US, Blood/urine lab work, repeat exams while in ED, and 

administration of strong opiates/narcotics for pain control, admission to 

hospital or close patient follow up.  





Diagnosis - colitis





Stable and discharged to home with prescriptions for Zantac, bentyl, flagyl, 

cipro.  Followup with PMD.  Return to ED if symptoms recur or worsen





Labs








Test


  10/30/17


07:50


 


White Blood Count


  8.3 K/UL


(4.8-10.8)


 


Red Blood Count


  5.66 M/UL


(4.70-6.10)


 


Hemoglobin


  16.4 G/DL


(14.2-18.0)


 


Hematocrit


  49.7 %


(42.0-52.0)


 


Mean Corpuscular Volume 88 FL (80-99) 


 


Mean Corpuscular Hemoglobin


  28.9 PG


(27.0-31.0)


 


Mean Corpuscular Hemoglobin


Concent 32.9 G/DL


(32.0-36.0)


 


Red Cell Distribution Width


  13.3 %


(11.6-14.8)


 


Platelet Count


  144 K/UL


(150-450)


 


Mean Platelet Volume


  9.6 FL


(6.5-10.1)


 


Neutrophils (%) (Auto)


  58.6 %


(45.0-75.0)


 


Lymphocytes (%) (Auto)


  26.8 %


(20.0-45.0)


 


Monocytes (%) (Auto)


  11.5 %


(1.0-10.0)


 


Eosinophils (%) (Auto)


  1.2 %


(0.0-3.0)


 


Basophils (%) (Auto)


  1.9 %


(0.0-2.0)


 


Sodium Level


  137 MMOL/L


(136-145)


 


Potassium Level


  3.7 MMOL/L


(3.5-5.1)


 


Chloride Level


  101 MMOL/L


()


 


Carbon Dioxide Level


  23 MMOL/L


(21-32)


 


Anion Gap


  13 mmol/L


(5-15)


 


Blood Urea Nitrogen


  15 mg/dL


(7-18)


 


Creatinine


  1.0 MG/DL


(0.55-1.30)


 


Estimat Glomerular Filtration


Rate > 60 mL/min


(>60)


 


Glucose Level


  108 MG/DL


()


 


Calcium Level


  9.6 MG/DL


(8.5-10.1)


 


Total Bilirubin


  0.6 MG/DL


(0.2-1.0)


 


Aspartate Amino Transf


(AST/SGOT) 22 U/L (15-37) 


 


 


Alanine Aminotransferase


(ALT/SGPT) 26 U/L (12-78) 


 


 


Alkaline Phosphatase


  54 U/L


()


 


Total Protein


  8.2 G/DL


(6.4-8.2)


 


Albumin


  3.7 G/DL


(3.4-5.0)


 


Globulin 4.5 g/dL 


 


Albumin/Globulin Ratio 0.8 (1.0-2.7) 


 


Lipase


  196 U/L


()











Last Vital Signs








  Date Time  Temp Pulse Resp B/P (MAP) Pulse Ox O2 Delivery O2 Flow Rate FiO2


 


10/30/17 07:25 98.4 117 20 105/68 95 Room Air  








Status:  improved


Disposition:  HOME, SELF-CARE


Condition:  Stable


Scripts


Ranitidine Hcl* (ZANTAC*) 150 Mg Tablet


150 MG ORAL DAILY, #30 TAB 0 Refills


   Prov: TIARA LAUREN M.D.         10/30/17 


Dicyclomine Hcl* (BENTYL*) 10 Mg Capsule


10 MG ORAL FOUR TIMES A DAY, #20 CAP


   Prov: TIARA LAUREN M.D.         10/30/17 


Metronidazole* (FLAGYL*) 500 Mg Tablet


500 MG ORAL THREE TIMES A DAY, #21 TAB


   Prov: TIARA LAUREN M.D.         10/30/17 


Ciprofloxacin Hcl* (CIPROFLOXACIN HCL*) 500 Mg Tablet


500 MG ORAL Q12H, #14 TAB 0 Refills


   Prov: TIARA LAUREN M.D.         10/30/17


Referrals:  


[facility],REFERRING (PCP)











TIARA LAUREN M.D. Oct 30, 2017 08:07

## 2018-01-18 NOTE — EMERGENCY ROOM REPORT
History of Present Illness


General


Chief Complaint:  Sore Throat


Source:  Patient





Present Illness


HPI


32-year-old male presents with sore throat for one week


States sore throat, +mild dry cough. Pain with swallowing however has still 

been able to eat/drink. No change in voice. No pain with extension/movement of 

neck. 


Denies fever or chills.


No sick contacts.


Allergies:  


Coded Allergies:  


     PENICILLINS (Unverified  Allergy, Unknown, 5/28/15)





Patient History


Past Medical History:  see triage record


Past Surgical History:  none


Pertinent Family History:  none


Reviewed Nursing Documentation:  PMH: Agreed, PSxH: Agreed





Nursing Documentation-PMH


Past Medical History:  No History, Except For


Hx Hypertension:  No


Hx Pacemaker:  No


Hx Asthma:  Yes


Hx COPD:  No


Hx Diabetes:  No


Hx Cancer:  No


Hx Gastrointestinal Problems:  No


Hx Dialysis:  No


History Of Psychiatric Problem:  No


Hx Neurological Problems:  No


Hx Cerebrovascular Accident:  No


Hx Seizures:  No





Review of Systems


All Other Systems:  negative except mentioned in HPI





Physical Exam





Vital Signs








  Date Time  Temp Pulse Resp B/P (MAP) Pulse Ox O2 Delivery O2 Flow Rate FiO2


 


1/18/18 10:05 98.2 79 14 112/76 100 Room Air  








Sp02 EP Interpretation:  reviewed, normal


General Appearance:  normal inspection, well appearing, no apparent distress, 

alert, GCS 15, non-toxic


Head:  normocephalic, atraumatic


Eyes:  bilateral eye normal inspection, bilateral eye PERRL, bilateral eye EOMI


ENT:  uvula midline, tonsillar swelling, tonsillar exudate, other - no signs pta


Neck:  normal inspection, full range of motion, supple


Respiratory:  normal inspection, lungs clear, normal breath sounds, no 

respiratory distress, no retraction, no wheezing, speaking full sentences, 

chest symmetrical


Cardiovascular #1:  normal inspection, regular rate, rhythm, no edema, normal 

capillary refill


Cardiovascular #2:  2+ radial (R), 2+ radial (L)


Gastrointestinal:  normal inspection, non tender, soft, non-distended, no 

guarding


Genitourinary:  no CVA tenderness


Musculoskeletal:  normal inspection, back normal, normal range of motion, non-

tender


Neurologic:  normal inspection, alert, oriented x3, responsive, motor strength/

tone normal, sensory intact, normal gait, speech normal


Psychiatric:  normal inspection, judgement/insight normal, memory normal


Skin:  normal inspection, normal color, no rash, warm/dry, well hydrated, 

normal turgor





Medical Decision Making


Diagnostic Impression:  


 Primary Impression:  


 Tonsillitis


ER Course


32-year-old male with sore throat





DDX: 


Viral vs. infectious mononucleosis vs. bacterial pharyngitis vs. allergies


Other serious causes such as PTA / RPA / deep space neck infection 


history/physical most consistent with bacterial pharyngitis





Plan:


None in the emergency room





ER course:


Patient remains stable in ED. 





Disposition:


Patient will be discharged to home. 


Azithromycin and Motrin given, patient is allergic to penicillin


Patient will follow up with primary care doctor within 5 days. Strict return 

precautions discussed with patient such as worsening throat pain/swelling, 

dysphagia, high fever or chills, shortness of breath, abdominal pain, which may 

indicate severe illness. Patient verbalized understanding and agreed with plan. 





Please note that this Emergency Department Report was dictated using [employer] technology software, occasionally this can lead to 

erroneous entry secondary to interpretation by the dictation equipment.





Last Vital Signs








  Date Time  Temp Pulse Resp B/P (MAP) Pulse Ox O2 Delivery O2 Flow Rate FiO2


 


1/18/18 10:37 98.2  14 112/76 100 Room Air  


 


1/18/18 10:05  79      








Disposition:  HOME, SELF-CARE


Condition:  Improved


Scripts


Azithromycin (AZITHROMYCIN) 500 Mg Tablet


500 MG ORAL DAILY for 5 Days, #5 TAB 0 Refills


   Prov: Fan Ragland M.D.         1/18/18 


Ibuprofen* (MOTRIN*) 600 Mg Tablet


600 MG ORAL Q8H Y for For Pain, #30 TAB 0 Refills


   Prov: Fan Ragland M.D.         1/18/18


Referrals:  


[facility]OSEN [facility],REFERRING


Patient Instructions:  Tonsillitis











Fan Ragland M.D. Jan 18, 2018 12:35

## 2018-02-26 NOTE — EMERGENCY ROOM REPORT
History of Present Illness


General


Chief Complaint:  Sore Throat


Source:  Patient





Present Illness


HPI


32-year-old male presents with sore throat for 5 days. 


Patient was recently diagnosed with tonsillitis in the emergency room on 

February 18, states that he took antibiotics and felt much better, however 

symptoms returned again 5 days ago


Complaining of some cough, runny nose


Pain with swallowing however has still been able to eat/drink. No pain with 

extension/movement of neck.


Allergies:  


Coded Allergies:  


     PENICILLINS (Unverified  Allergy, Unknown, 5/28/15)





Patient History


Past Medical History:  see triage record


Past Surgical History:  none


Pertinent Family History:  none


Reviewed Nursing Documentation:  PMH: Agreed, PSxH: Agreed





Nursing Documentation-PMH


Past Medical History:  No History, Except For


Hx Hypertension:  No


Hx Pacemaker:  No


Hx Asthma:  Yes


Hx COPD:  No


Hx Diabetes:  No


Hx Cancer:  No


Hx Gastrointestinal Problems:  No


Hx Dialysis:  No


History Of Psychiatric Problem:  Yes - Depression


Hx Neurological Problems:  No


Hx Cerebrovascular Accident:  No


Hx Seizures:  No





Review of Systems


All Other Systems:  negative except mentioned in HPI





Physical Exam





Vital Signs








  Date Time  Temp Pulse Resp B/P (MAP) Pulse Ox O2 Delivery O2 Flow Rate FiO2


 


2/26/18 12:25 97.7 83 16 117/77 96 Room Air  





 97.7       








Sp02 EP Interpretation:  reviewed, normal


General Appearance:  normal inspection, well appearing, no apparent distress, 

alert, GCS 15, non-toxic


Head:  normocephalic, atraumatic


Eyes:  bilateral eye normal inspection, bilateral eye PERRL, bilateral eye EOMI


ENT:  tonsillar swelling, pharyngeal erythema, other - no exudates


Neck:  normal inspection, full range of motion, supple


Respiratory:  normal inspection, lungs clear, normal breath sounds, no 

respiratory distress, no retraction, no wheezing, speaking full sentences, 

chest symmetrical


Cardiovascular #1:  normal inspection, regular rate, rhythm, no edema, normal 

capillary refill


Cardiovascular #2:  2+ radial (R), 2+ radial (L)


Gastrointestinal:  normal inspection, non tender, soft, non-distended, no 

guarding


Genitourinary:  no CVA tenderness


Musculoskeletal:  normal inspection, back normal, normal range of motion, non-

tender


Neurologic:  normal inspection, alert, oriented x3, responsive, motor strength/

tone normal, sensory intact, normal gait, speech normal


Psychiatric:  normal inspection, judgement/insight normal, memory normal


Skin:  normal inspection, normal color, no rash, warm/dry, well hydrated, 

normal turgor





Medical Decision Making


Diagnostic Impression:  


 Primary Impression:  


 Tonsillitis


ER Course


32-year-old male with sore throat for 5 days





DDX: 


Viral vs. infectious mononucleosis vs. bacterial pharyngitis vs. allergies


Other serious causes such as PTA / RPA / deep space neck infection 


history/physical most consistent with viral pharyngitis





Plan:


Toradol and Decadron





ER course:


Patient remains stable in ED. Pt states improvement of pain





Disposition:


Patient will be discharged to home. 


Patient will follow up with primary care doctor and ENT in 5 days. 


Please note that this Emergency Department Report was dictated using [employer] technology software, occasionally this can lead to 

erroneous entry secondary to interpretation by the dictation equipment.





Last Vital Signs








  Date Time  Temp Pulse Resp B/P (MAP) Pulse Ox O2 Delivery O2 Flow Rate FiO2


 


2/26/18 12:34 97.7 71 16 119/77 97 Room Air  





 97.7       








Disposition:  HOME, SELF-CARE


Condition:  Improved


Referrals:  


[facility],REFERRING (PCP)


Patient Instructions:  Tonsillitis





Additional Instructions:  


PLEASE SEE AN ENT DOCTOR IN 1 WEEK WITHOUT FAIL











Fan Ragland M.D. Feb 26, 2018 13:03

## 2018-02-28 NOTE — EMERGENCY ROOM REPORT
History of Present Illness


General


Chief Complaint:  Sore Throat


Source:  Patient





Present Illness


HPI


32-year-old male presents to the emergency department complaining of persistent/

continued sore throat with tonsillar swelling for over one week.  Patient has 

been seen twice here within the past 2 weeks for his symptoms initially he was 

prescribed azithromycin and and conservative treatment he continues to return 

with no improvement of his symptoms.  He denies fevers or chills reports 

history of HIV states that his CD4 count is well above 500 he states his viral 

load is undetectable.  Patient reports some nasal congestion and rhinorrhea. 

denies cough. Denies CP, Palpitations, LOC, AMS, dizziness, Changes in Vision, 

Sensation, paresthesias, or a sudden severe headache.


Allergies:  


Coded Allergies:  


     PENICILLINS (Unverified  Allergy, Unknown, 5/28/15)





Patient History


Past Medical History:  see triage record, HIV


Past Surgical History:  none


Pertinent Family History:  none


Immunizations:  UTD


Reviewed Nursing Documentation:  PMH: Agreed, PSxH: Agreed





Nursing Documentation-PMH


Hx Asthma:  Yes





Review of Systems


All Other Systems:  negative except mentioned in HPI





Physical Exam





Vital Signs








  Date Time  Temp Pulse Resp B/P (MAP) Pulse Ox O2 Delivery O2 Flow Rate FiO2


 


2/28/18 12:21 98.4 96 16 149/73 98 Room Air  





 98.4       








Sp02 EP Interpretation:  reviewed, normal


General Appearance:  no apparent distress, alert, GCS 15, non-toxic


Head:  normocephalic, atraumatic


Eyes:  bilateral eye normal inspection, bilateral eye PERRL


ENT:  hearing grossly normal, normal voice, TMs + canals normal, nasal 

congestion, pharyngeal erythema, other - scant tonsillar exudates, cobble stone 

appearance of the pharynx.


Neck:  full range of motion, no meningismus, no bony tend


Respiratory:  chest non-tender, lungs clear, normal breath sounds, speaking 

full sentences


Cardiovascular #1:  regular rate, rhythm


Musculoskeletal:  back normal, gait/station normal, normal range of motion


Neurologic:  alert, oriented x3, responsive, motor strength/tone normal, 

sensory intact, speech normal, grossly normal


Psychiatric:  judgement/insight normal


Skin:  normal color, no rash, warm/dry, well hydrated


Lymphatic:  no adenopathy





Medical Decision Making


PA Attestation


 Dr. Ragland is my supervising Physician whom patient management has been 

discussed with.


Diagnostic Impression:  


 Primary Impression:  


 Pharyngitis


 Qualified Codes:  A54.5 - Gonococcal pharyngitis


ER Course


32-year-old male presents to the emergency department complaining of persistent/

continued sore throat with tonsillar swelling for over one week.  Patient has 

been seen twice here within the past 2 weeks for his symptoms initially he was 

prescribed azithromycin and and conservative treatment he continues to return 

with no improvement of his symptoms.  He denies fevers or chills reports 

history of HIV states that his CD4 count is well above 500 he states his viral 

load is undetectable.  Patient reports some nasal congestion and rhinorrhea. 

denies cough. Denies CP, Palpitations, LOC, AMS, dizziness, Changes in Vision, 

Sensation, paresthesias, or a sudden severe headache.





HIV Pos. CD4 above 500, Viral Load of Zero at last visit with PMD. 





Ddx considered but are not limited to: pharyngitis, strep, PTA, ludwigs angina, 

URI, PND 


Vital signs: are WNL, pt. is afebrile 


H&PE are most consistent with: pharyngitis presumed gonococcal as previous abx 

azithromycin and conservative treatment not helping. 





ORDERS: None required at this time as the diagnosis is clinical 





ED INTERVENTIONS: 


-250mg Rocephin IM








d/w pt. that it is imperative that he follow up with ENT specialist and his PCP 

if symptoms are persistent. d/w pt. that allergies/PND maybe the cause or 

effects of HIV such as HIV esophagitis. 





DISCHARGE: At this time pt. is stable for d/c to home. Will provide printed 

patient care instructions, and any necessary prescriptions. Care plan and 

follow up instructions have been discussed with the patient prior to discharge.





Last Vital Signs








  Date Time  Temp Pulse Resp B/P (MAP) Pulse Ox O2 Delivery O2 Flow Rate FiO2


 


2/28/18 12:21 98.4 96 16 149/73 98 Room Air  





 98.4       








Disposition:  HOME, SELF-CARE


Condition:  Stable


Scripts


Loratadine/Pseudoephedrine (CLARITIN-D 12 HOUR TABLET) 1 Each Tab.er.12h


1 TAB ORAL EVERY 12 HOURS for 5 Days, #10 TAB


   Prov: Gilma Vargas         2/28/18


Referrals:  


[facility] (PCP)


Patient Instructions:  Sore Throat





Additional Instructions:  


Take medications as directed. 





 ** Follow up with an ENT SPECIALIST in 3-5 days, even if your symptoms have 

resolved. ** 


--Please review list of primary care clinics, if you do not already have a 

primary care provider





Return sooner to ED if new symptoms occur, or current symptoms become worse. 











- Please note that this Emergency Department Report was dictated using [employer] technology software, occasionally this can lead to 

erroneous entry secondary to interpretation by the dictation equipment.











Gilma Vargas Feb 28, 2018 13:04
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
## 2018-05-01 NOTE — EMERGENCY ROOM REPORT
History of Present Illness


General


Chief Complaint:  General Complaint


Source:  Patient





Present Illness


HPI


32-year-old male presents to the emergency department complaining of persistent 

cough 2 weeks with increased sputum that he is unable to clear easily from his 

throat.  Patient reports history of asthma and has had increases in wheezing.  

Patient states that he uses his inhaler every once in a while if his symptoms 

are really bad but not consistently throughout the day.  He denies fevers, 

chills, nausea or vomiting he reports 3 episodes of loose stools he denies 

blood in the stools or black tarry stool. Denies abdominal tenderness. Pt. 

reports regularly having GI symptoms due to medications.  Patient is HIV 

positive he states his last CD4 count is well above 500, and viral load is 

undetectable.  He denies recent travel or ill contacts.  Reports some nasal 

congestion denies sore throat or ear pain.  Denies neck pain or stiffness. 

Denies CP, Palpitations, LOC, AMS, dizziness, Changes in Vision, Sensation, 

paresthesias, or a sudden severe headache.


Allergies:  


Coded Allergies:  


     PENICILLINS (Unverified  Allergy, Unknown, 5/28/15)





Patient History


Past Medical History:  see triage record, HIV


Past Surgical History:  none


Pertinent Family History:  none


Immunizations:  UTD


Reviewed Nursing Documentation:  PMH: Agreed; PSxH: Agreed





Nursing Documentation-PMH


Hx Hypertension:  No


Hx Pacemaker:  No


Hx Asthma:  Yes


Hx COPD:  No


Hx Diabetes:  No


Hx Cancer:  No


Hx Gastrointestinal Problems:  No


Hx Dialysis:  No


Hx Neurological Problems:  No


Hx Cerebrovascular Accident:  No


Hx Seizures:  No





Review of Systems


All Other Systems:  negative except mentioned in HPI





Physical Exam





Vital Signs








  Date Time  Temp Pulse Resp B/P (MAP) Pulse Ox O2 Delivery O2 Flow Rate FiO2


 


5/1/18 15:42 98.0 79 17 116/78 99 Room Air  





 98.1       








Sp02 EP Interpretation:  reviewed, normal


General Appearance:  no apparent distress, alert, GCS 15, non-toxic


Head:  normocephalic, atraumatic


Eyes:  bilateral eye normal inspection, bilateral eye PERRL


ENT:  hearing grossly normal, normal pharynx, normal voice, TMs + canals normal

, uvula midline, moist mucus membranes, nasal congestion


Neck:  full range of motion, no meningismus, no bony tend


Respiratory:  chest non-tender, lungs clear, normal breath sounds, no rhonchi, 

no respiratory distress, speaking full sentences, wheezing - scant expiratory 

bilat


Cardiovascular #1:  regular rate, rhythm, normal capillary refill


Gastrointestinal:  normal bowel sounds, non tender, soft, no guarding


Rectal:  deferred


Musculoskeletal:  back normal, gait/station normal, normal range of motion, non-

tender


Neurologic:  alert, oriented x3, responsive, motor strength/tone normal, 

sensory intact, speech normal, grossly normal


Psychiatric:  judgement/insight normal


Skin:  normal color, no rash, warm/dry, well hydrated


Lymphatic:  no adenopathy





Medical Decision Making


PA Attestation


Dr. Levi is my supervising Physician whom patient management has been 

discussed with.


Diagnostic Impression:  


 Primary Impression:  


 Asthmatic bronchitis


 Qualified Codes:  J45.21 - Mild intermittent asthma with (acute) exacerbation


 Additional Impression:  


 Diarrhea


 Qualified Codes:  R19.7 - Diarrhea, unspecified


ER Course


32-year-old male presents to the emergency department complaining of persistent 

cough 2 weeks with increased sputum that he is unable to clear easily from his 

throat.  Patient reports history of asthma and has had increases in wheezing.  

Patient states that he uses his inhaler every once in a while if his symptoms 

are really bad but not consistently throughout the day.  He denies fevers, 

chills, nausea or vomiting he reports 3 episodes of loose stools he denies 

blood in the stools or black tarry stool.  Patient is HIV positive he states 

his last CD4 count is well above 500, and viral load is undetectable.  He 

denies recent travel or ill contacts.  Reports some nasal congestion denies 

sore throat or ear pain.  Denies neck pain or stiffness. Denies CP, Palpitations

, LOC, AMS, dizziness, Changes in Vision, Sensation, paresthesias, or a sudden 

severe headache.








Ddx considered but are not limited to asthma exacerbation, CHF, URI, pneumonia, 

PE, strep pharyngitis, meningitis.





Vital signs: Pt. is afebrile,  VS are WNL


H&PE are most consistent with  bronchitis / asthma exacerbation.   Pt. is well 

hydrated, no evidence of acute abdomen or infectious process at this time. 





ORDERS: none required at this time, the diagnosis is clinical





ED INTERVENTIONS: 


-DUO NEBS treatment. 


- re-examination post nebulized treatment lungs are CTA bilaterally. 





DISCHARGE: At this time pt. is stable for d/c to home. Will provide printed 

patient care instructions, and any necessary prescriptions. Care plan and 

follow up instructions have been discussed with the patient prior to discharge.





Last Vital Signs








  Date Time  Temp Pulse Resp B/P (MAP) Pulse Ox O2 Delivery O2 Flow Rate FiO2


 


5/1/18 15:48 98.1 79 17 116/78 99 Room Air  





 98.1       








Disposition:  HOME, SELF-CARE


Condition:  Stable


Scripts


Benzonatate* (TESSALON PERLE*) 100 Mg Capsule


100 MG ORAL THREE TIMES A DAY for 7 Days, #21 PERLE


   Prov: Gilma Vargas.         5/1/18 


Albuterol Sulfate* (ALBUTEROL SULFATE MDI*) 8.5 Gm Hfa.aer.ad


2 PUFF INH Q3H, #1 INH 0 Refills


   Prov: Gilma Vargas         5/1/18 


Guaifenesin (Guaifenesin) 1,200 Mg Tab.er.12h


1200 MG PO BID, #20 TAB


   Prov: Gilma Vargas         5/1/18 


Loratadine/Pseudoephedrine (CLARITIN-D 12 HOUR TABLET) 1 Each Tab.er.12h


1 TAB ORAL EVERY 12 HOURS for 7 Days, #14 TAB


   Prov: Gilma Vargas.         5/1/18


Referrals:  


[facility] (PCP)


Patient Instructions:  Acute Bronchitis, Easy-to-Read, Asthma, Adult, Easy-to-

Read





Additional Instructions:  


Take medications as directed. 


 ** Follow up with a Primary Care Provider in 3-5 days, even if your symptoms 

have resolved. ** PULMONOLOGY EVAL ** 


--Please review list of primary care clinics, if you do not already have a 

primary care provider





Return sooner to ED if new symptoms occur, or current symptoms become worse. 








- Please note that this Emergency Department Report was dictated using [employer] technology software, occasionally this can lead to 

erroneous entry secondary to interpretation by the dictation equipment.











Gilma Vargas May 1, 2018 17:20
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
## 2018-07-02 NOTE — DIAGNOSTIC IMAGING REPORT
Indication: Chest pain

 

Comparison:  6/13/2016

 

A single view chest radiograph was obtained.

 

Findings:

 

Cardiomediastinal appearance is within normal limits for age.  Pulmonary vascularity

is appropriate. The diaphragmatic contour is smooth and costophrenic angles are

sharp. No pleural effusions are identified. The bones are unremarkable.

 

Impression: No acute findings

## 2018-07-02 NOTE — EMERGENCY ROOM REPORT
History of Present Illness


General


Chief Complaint:  Asthma


Source:  Patient, Medical Record





Present Illness


HPI


32-year-old male patient since the ER complaining of shortness of breath 1 

week.  Reports history of asthma.  Reports has been using inhaler with mild 

relief of symptoms.  Reports symptoms worsen night.  Denies cough.  Denies 

hemoptysis.  Denies calf pain.  Denies recent periods of immobilization.  

Denies history of MI or heart disease.  Denies chest pain, abdominal pain, 

vomiting, fever. Denies neck pain or stiffness. Hx of HIV, viral load 

undetectable, CD4 is elevated to normal range. Denies contacts with similar 

symptoms. Denies recent travel.


Allergies:  


Coded Allergies:  


     PENICILLINS (Unverified  Allergy, Unknown, 5/28/15)





Patient History


Past Medical History:  see triage record


Reviewed Nursing Documentation:  PMH: Agreed; PSxH: Agreed





Nursing Documentation-PMH


Hx Hypertension:  No


Hx Pacemaker:  No


Hx Asthma:  Yes


Hx COPD:  No


Hx Diabetes:  No


Hx Cancer:  No


Hx Gastrointestinal Problems:  No


Hx Dialysis:  No


Hx Neurological Problems:  No


Hx Cerebrovascular Accident:  No


Hx Seizures:  No





Review of Systems


All Other Systems:  negative except mentioned in HPI





Physical Exam





Vital Signs








  Date Time  Temp Pulse Resp B/P (MAP) Pulse Ox O2 Delivery O2 Flow Rate FiO2


 


7/2/18 12:11 97.5 86 18 118/79 98 Room Air  





 97.5       








Sp02 EP Interpretation:  reviewed, normal


General Appearance:  well appearing, no apparent distress, alert, GCS 15, non-

toxic


Head:  normocephalic, atraumatic


Eyes:  bilateral eye normal inspection, bilateral eye PERRL


ENT:  hearing grossly normal, normal pharynx, no angioedema, normal voice, 

uvula midline, moist mucus membranes


Neck:  full range of motion


Respiratory:  lungs clear, no rhonchi, no respiratory distress, no accessory 

muscle use, no wheezing, decreased breath sounds, speaking full sentences


Cardiovascular #1:  regular rate, rhythm, no edema


Musculoskeletal:  back normal, digits/nails normal, gait/station normal, normal 

range of motion, non-tender, no calf tenderness, Walker's Sign negative


Neurologic:  alert, oriented x3, responsive, motor strength/tone normal, 

sensory intact


Skin:  no rash





Medical Decision Making


PA Attestation


Dr. Levi  is my supervising Physician whom patient management has been 

discussed with.


Diagnostic Impression:  


 Primary Impression:  


 Asthma


ER Course


Pt presents to ED c/o asthma symptoms.





DDX considered but are not limited to asthma, viral URI, influenza, bronchitis.





VITAL SIGNS are WNL, patient is afebrile.





Ordered breathing treatment and medication.





ER COURSE


Patient provided with prednisone in ER.


Albuterol/Atrovent breathing treatment provided.


Following treatment patient states no longer having difficulty with breathing. 

Patient is resting comfortably in no acute distress. 


chest x-ray negative for acute disease, no consolidation, patient afebrile, low 

suspicion for pneumonia.


Follow-up with PCP to discuss further treatment and referral for asthma 

symptoms due to history of asthma symptoms.





DISCHARGE:


-Rx provided for Albuterol MDI.





At this time pt is stable for d/c to home. Patient is resting comfortably in no 

acute distress, nontoxic appearing, able to answer questions without difficulty.


Patient to take medications as instructed


Will provide with patient care instructions and any necessary prescriptions.


Care plan and follow-up instructions provided.  Patient instructed to follow-up 

with primary care provider in 3 - 5 days.


Patient questions asked and answered.


Patient reports understanding and agreement to treatment plan.


ER precautions given. Patient instructed to return to ER immediately for any 

new or worsening of symptoms including but not limited to increasing SOB, 

persistent fever.





- Please note that this Emergency Department Report was dictated using [employer] technology software, occasionally this can lead to 

erroneous entry secondary to interpretation by the dictation equipment.


Chest X-Ray Diagnostic Results


Chest X-Ray Diagnostic Results :  


   Chest X-Ray Ordered:  Yes


   # of Views/Limited/Complete:  1 View


   Indication:  Chest Pain


   EP Interpretation:  Yes


   PA Xray:  Interpretation reviewed, by supervising MD, and agrees with 

findings.


   Interpretation:  no consolidation, no effusion, no pneumothorax, no acute 

cardiopulmonary disease


   Impression:  No acute disease


   PA Scribe Text


Nick Lombardo PA-C





Last Vital Signs








  Date Time  Temp Pulse Resp B/P (MAP) Pulse Ox O2 Delivery O2 Flow Rate FiO2


 


7/2/18 12:11 97.5 86 18 118/79 98 Room Air  





 97.5       








Status:  improved


Disposition:  HOME, SELF-CARE


Condition:  Stable


Scripts


Albuterol Sulfate* (ALBUTEROL SULFATE MDI*) 8.5 Gm Hfa.aer.ad


2 PUFF INH Q6H, #1 INH 0 Refills


   Prov: Reed Lombardo         7/2/18


Patient Instructions:  Asthma, Adult





Additional Instructions:  


Followup with primary care provider in 3 -5 days.


Take medications as directed. 


Patient questions asked and answered.


ER precautions given, patient instructed to return to ER immediately for any 

new or worsening of symptoms.











Reed Lombardo Jul 2, 2018 12:22

## 2018-08-07 NOTE — EMERGENCY ROOM REPORT
History of Present Illness


General


Chief Complaint:  Upper Respiratory Illness


Source:  Patient, Medical Record





Present Illness


HPI


32-year-old male patient presents ER complaining of asthma symptoms for the 

past few weeks.  Reports previously seen in the ER for similar symptoms.  

Reports symptoms briefly results and since that time however symptoms have 

returned.  Reports that he saw his primary care provider last week was 

prescribed a new inhaler, does not know name, however states that new inhalers 

not working.  Reports no fever.  Reports previously treated with prednisone 

which helped alleviate his symptoms.  Reports history of HIV, viral load 

undetectable, left was normal.  Denies other acute symptoms. Reports history of 

allergies. denies chest pain, abdominal pain, dysuria, hematuria.


Allergies:  


Coded Allergies:  


     PENICILLINS (Unverified  Allergy, Unknown, 5/28/15)





Patient History


Past Medical History:  see triage record


Reviewed Nursing Documentation:  PMH: Agreed; PSxH: Agreed





Nursing Documentation-PMH


Past Medical History:  No History, Except For


Hx Hypertension:  No


Hx Pacemaker:  No


Hx Asthma:  Yes


Hx COPD:  No


Hx Diabetes:  No


Hx Cancer:  No


Hx Gastrointestinal Problems:  No


Hx Dialysis:  No


Hx Neurological Problems:  No


Hx Cerebrovascular Accident:  No


Hx Seizures:  No





Review of Systems


All Other Systems:  negative except mentioned in HPI





Physical Exam





Vital Signs








  Date Time  Temp Pulse Resp B/P (MAP) Pulse Ox O2 Delivery O2 Flow Rate FiO2


 


8/7/18 11:58 97.9 84 18 109/78 96 Room Air  





 97.9       








Sp02 EP Interpretation:  reviewed, normal


General Appearance:  well appearing, no apparent distress, alert, GCS 15, non-

toxic


Head:  normocephalic, atraumatic


Eyes:  bilateral eye normal inspection, bilateral eye PERRL


ENT:  hearing grossly normal, normal pharynx, no angioedema, normal voice, 

uvula midline, moist mucus membranes


Neck:  full range of motion


Respiratory:  lungs clear, no rhonchi, no respiratory distress, no accessory 

muscle use, no wheezing, decreased breath sounds, speaking full sentences


Cardiovascular #1:  regular rate, rhythm, no edema


Musculoskeletal:  back normal, digits/nails normal, gait/station normal, normal 

range of motion, non-tender, no calf tenderness, Walker's Sign negative


Neurologic:  alert, oriented x3, responsive, motor strength/tone normal, 

sensory intact


Psychiatric:  mood/affect normal


Skin:  no rash





Medical Decision Making


PA Attestation


Dr. Membreno is my supervising Physician whom patient management has been 

discussed with.Dr. Membreno is my supervising Physician whom patient management 

has been discussed with.


Diagnostic Impression:  


 Primary Impression:  


 Asthma attack


ER Course


Pt presents to ED c/o asthma symptoms.





DDX considered but are not limited to asthma, viral URI, influenza, bronchitis.


Low suspicion for PE per Well's criteria.


no fever, patient afebrile, no crackles, low suspicion for pneumonia, does not 

require chest x-ray at this time.





VITAL SIGNS are WNL, patient is afebrile.





Ordered breathing treatment and medication.





ER COURSE


Reviewed previous patient charts. mild decreased breath sounds, ordered 

breathing treatment.


Patient provided with prednisone.


Albuterol/Atrovent breathing treatment provided.





Following treatment patient states no longer having difficulty with breathing, 

lungs clear to auscultation with normal breath sounds.


 Patient is resting comfortably in no acute distress, speaking full sentences, 

texting on his phone.


follow-up with primary care provider and discuss asthma medication.  Avoid 

potential allergens.


advised patient on dangers of long-term steroid use.


Declined need for rx refill of  inhaler medication.





DISCHARGE:


-Rx given for Prednisone. Begin taking tomorrow.





At this time pt is stable for d/c to home. Patient is resting comfortably in no 

acute distress, nontoxic appearing, able to answer questions without difficulty.


Patient to take medications as instructed


Will provide with patient care instructions and any necessary prescriptions.


Care plan and follow-up instructions provided.  Patient instructed to follow-up 

with primary care provider in 3 - 5 days.


Patient questions asked and answered.


Patient reports understanding and agreement to treatment plan.


ER precautions given. Patient instructed to return to ER immediately for any 

new or worsening of symptoms including but not limited to increasing SOB, 

persistent fever.





- Please note that this Emergency Department Report was dictated using [employer] technology software, occasionally this can lead to 

erroneous entry secondary to interpretation by the dictation equipment.





Last Vital Signs








  Date Time  Temp Pulse Resp B/P (MAP) Pulse Ox O2 Delivery O2 Flow Rate FiO2


 


8/7/18 12:02  84 18   Room Air  


 


8/7/18 12:02 97.9   109/78 96   





 97.9       








Status:  improved


Disposition:  HOME, SELF-CARE


Condition:  Stable


Scripts


Prednisone* (PREDNISONE*) 20 Mg Tablet


40 MG ORAL DAILY, #6 TAB


   Prov: Reed Lombardo         8/7/18


Patient Instructions:  Asthma Attack Prevention





Additional Instructions:  


Followup with primary care provider in 3 -5 days. Discuss asthma medication.


Take medications as directed. Begin taking tomorrow. 


Patient declined need for refill of inhalers.


Patient questions asked and answered.


ER precautions given, patient instructed to return to ER immediately for any 

new or worsening of symptoms.











Reed Lombardo Aug 7, 2018 12:27

## 2018-08-23 NOTE — EMERGENCY ROOM REPORT
History of Present Illness


General


Chief Complaint:  Male Urogenital Problems


Source:  Patient





Present Illness


HPI


32-year-old male patient presents ER complaining of any pain with urination for 

the past 2 weeks.  Reports history of HIV, viral count normal, CD4 normal.  

Denies discharge or penile lesions.  Reports sexually active with partner.  

Reports uses protection except for oral sex.  Partner is also HIV positive as 

well, denies fever, chest pain, shortness of breath, abdominal pain, vomiting, 

back pain. Denies penile discharge. denies hematuria or testicular swelling or 

pain. denies vomiting.


Allergies:  


Coded Allergies:  


     PENICILLINS (Unverified  Allergy, Unknown, 5/28/15)





Patient History


Past Medical History:  see triage record


Reviewed Nursing Documentation:  PMH: Agreed; PSxH: Agreed





Nursing Documentation-PMH


Past Medical History:  No Stated History


Hx Hypertension:  No


Hx Pacemaker:  No


Hx Asthma:  Yes


Hx COPD:  No


Hx Diabetes:  No


Hx Cancer:  No


Hx Gastrointestinal Problems:  No


Hx Dialysis:  No


Hx Neurological Problems:  No


Hx Cerebrovascular Accident:  No


Hx Seizures:  No





Review of Systems


All Other Systems:  negative except mentioned in HPI





Physical Exam





Vital Signs








  Date Time  Temp Pulse Resp B/P (MAP) Pulse Ox O2 Delivery O2 Flow Rate FiO2


 


8/23/18 14:04 98.1 73 16 118/81 97 Room Air  





 98.1       








Sp02 EP Interpretation:  reviewed, normal


General Appearance:  well appearing, no apparent distress, alert, GCS 15, non-

toxic


Head:  normocephalic, atraumatic


Eyes:  bilateral eye normal inspection, bilateral eye PERRL


ENT:  hearing grossly normal, normal pharynx, no angioedema, normal voice, 

uvula midline, moist mucus membranes


Neck:  full range of motion


Respiratory:  lungs clear, normal breath sounds, no rhonchi, no respiratory 

distress, no accessory muscle use, no wheezing, speaking full sentences


Cardiovascular #1:  regular rate, rhythm, no edema


Genitourinary:  no CVA tenderness, deferred


Musculoskeletal:  back normal, digits/nails normal, gait/station normal, normal 

range of motion, non-tender


Neurologic:  alert, oriented x3, responsive, motor strength/tone normal, 

sensory intact


Skin:  no rash





Medical Decision Making


PA Attestation


Dr. Beatty is my supervising Physician whom patient management has been 

discussed with.


Diagnostic Impression:  


 Primary Impression:  


 Encounter for assessment of sexually transmitted disease exposure


ER Course


Pt. presents to the ED c/o dysuria.





Ddx considered but are not limited to gonorrhea, chalmydia, cystitis, 

pylonephritis. 





Vital signs: are WNL, pt. is afebrile





Ordered UA and abx.





ER COURSE:


UA results unremarkable, no signs of infection, does not require treatment with 

antibiotics as an outpatient.  Will provide Pyridium for pain with urination.  

Side effect may turn urine orange.


Provided patient with Rocephin and Azithromycin in the ER. Informed patient 

medications will cover for gonorrhea and chlamydia, needs further follow-up 

evaluation and possible treatment of other sexual transmitted infections.


provided with contact information for STI clinics.


Advised to use safe sex practices including but not limited to use of condoms. 

Avoid sexual activity for the next 2 weeks.


Instructed patient to follow up with STI clinic and/or PCP for STI evaluation 

and further treatment as necessary.


Instructed patient to inform partners of needs for evaluation and treatment of 

possible infections.


patient states she has a follow-up appointment with his primary care provider, 

will discuss evaluation and treatment at that time.





DISCHARGE:


Rx provided for Pyridium





Patient is resting comfortably, in no acute distress, nontoxic appearing, 

talking without difficulty.


Patient to take medications as instructed


Will provide with patient care instructions and any necessary prescriptions.


Care plan and follow-up instructions provided.  Patient instructed to follow-up 

with primary care provider in 3 - 5 days.


Patient questions asked and answered.


Patient reports understanding and agreement to treatment plan.


ER precautions given. Patient instructed to return to ER immediately for any 

new or worsening of symptoms including but not limited to increasing SOB, 

persistent fever. 





- Please note that this Emergency Department Report was dictated using [email or web address] technology software, occasionally this can lead to 

erroneous entry secondary to interpretation by the dictation equipment.





Labs








Test


  8/23/18


14:35


 


Urine Color Yellow 


 


Urine Appearance Clear 


 


Urine pH 5 (4.5-8.0) 


 


Urine Specific Gravity


  1.020


(1.005-1.035)


 


Urine Protein 1+ (NEGATIVE) 


 


Urine Glucose (UA)


  Negative


(NEGATIVE)


 


Urine Ketones 1+ (NEGATIVE) 


 


Urine Blood


  Negative


(NEGATIVE)


 


Urine Nitrite


  Negative


(NEGATIVE)


 


Urine Bilirubin


  Negative


(NEGATIVE)


 


Urine Urobilinogen


  Normal MG/DL


(0.0-1.0)


 


Urine Leukocyte Esterase


  Negative


(NEGATIVE)


 


Urine RBC


  0-2 /HPF (0 -


0)


 


Urine WBC


  0-2 /HPF (0 -


0)


 


Urine Squamous Epithelial


Cells Occasional


/LPF


 


Urine Bacteria


  Few /HPF


(NONE)











Last Vital Signs








  Date Time  Temp Pulse Resp B/P (MAP) Pulse Ox O2 Delivery O2 Flow Rate FiO2


 


8/23/18 14:04 98.1 73 16 118/81 97 Room Air  





 98.1       








Disposition:  HOME, SELF-CARE


Condition:  Stable


Scripts


Phenazopyridine Hcl* (PYRIDIUM*) 100 Mg Tablet


100 MG ORAL THREE TIMES A DAY, #10 TAB


   Prov: Reed Lombardo         8/23/18


Referrals:  


[facility] (PCP)


Patient Instructions:  Dysuria, Sexually Transmitted Disease, Easy-to-Read





Additional Instructions:  


Followup with primary care provider and followup with STI clinic for further 

evaluation and treatment.


Alert sexual partners for need for evaluation and treatment.


Wear condoms during sex.


Take medications as instructed. Side effect of medication, may turn urine 

orange.


Avoid sexual activity for 2 weeks.


Drink plenty of fluids.


Patient questions asked and answered.


ER precautions given, patient instructed to return to ER immediately for any 

new or worsening of symptoms.











Reed Lombardo Aug 23, 2018 15:08

## 2019-03-04 NOTE — NUR
ED Nurse Note:



Pt walked in to ER c/o chest pain for 3 days without N/V. pt aao x4 and 
ambulatory. skin clean and intact. pt calm and cooperative with initial 
assessment.

## 2019-03-04 NOTE — DIAGNOSTIC IMAGING REPORT
Indication: Cough

 

Technique: One view of the chest

 

Comparison: 7/2/2018

 

Findings: Lungs and pleural spaces are clear. Heart size is normal. No significant

change

 

Impression: No acute process

## 2019-03-04 NOTE — EMERGENCY ROOM REPORT
History of Present Illness


General


Chief Complaint:  Chest Pain


Source:  Patient





Present Illness


HPI


Patient presents emergency department today complaint chest pains sore throat.  

Patient has history HIV but states that his viral counts undetectable and CD4 

counts are fine.  He states that he develop acute onset of some sore throat and 

some chest discomfort worse with coughing.  He denies any leg pain leg 

swelling.  States that feels like a tightness or pulling in his chest.  Denies 

any other associated signs symptoms.  No other modifying factors no other 

complaints.  Symptoms noted to be mild.No other modifying factors.  No other 

associated signs and symptoms.  No other complaints were noted.


Allergies:  


Coded Allergies:  


     PENICILLINS (Unverified  Allergy, Unknown, 3/4/19)





Patient History


Past Medical History:  HIV


Past Surgical History:  none


Pertinent Family History:  none


Social History:  Denies: smoking, alcohol use, drug use


Reviewed Nursing Documentation:  PMH: Agreed; PSxH: Agreed





Nursing Documentation-PMH


Past Medical History:  No History, Except For


Hx Hypertension:  No


Hx Pacemaker:  No


Hx Asthma:  Yes


Hx COPD:  No


Hx Diabetes:  No


Hx Cancer:  No


Hx Gastrointestinal Problems:  No


Hx Dialysis:  No


Hx Neurological Problems:  No


Hx Cerebrovascular Accident:  No


Hx Seizures:  No





Review of Systems


All Other Systems:  negative except mentioned in HPI





Physical Exam





Vital Signs








  Date Time  Temp Pulse Resp B/P (MAP) Pulse Ox O2 Delivery O2 Flow Rate FiO2


 


3/4/19 10:59 97.3 98 17 122/78 98 Room Air  








Sp02 EP Interpretation:  reviewed, normal


General Appearance:  normal inspection, well appearing, no apparent distress, 

alert


Head:  atraumatic


Eyes:  bilateral eye normal inspection


ENT:  hearing grossly normal, normal voice, tonsillar exudate


Neck:  normal inspection, full range of motion, supple, no bony tend


Respiratory:  normal inspection, lungs clear, normal breath sounds, no 

respiratory distress, no retraction, no wheezing


Cardiovascular #1:  regular rate, rhythm, no edema


Gastrointestinal:  normal inspection, normal bowel sounds, non tender, soft, no 

guarding, no hernia


Genitourinary:  no CVA tenderness


Musculoskeletal:  normal inspection, back normal, normal range of motion


Neurologic:  normal inspection, alert, responsive, speech normal


Psychiatric:  normal inspection, judgement/insight normal, mood/affect normal


Skin:  normal inspection, normal color, no rash





Medical Decision Making


Diagnostic Impression:  


 Primary Impression:  


 Gastritis


 Additional Impression:  


 Tonsillitis


ER Course


Patient presents emergency department today complaint chest pain.  Difficult 

considerations include gastroesophageal reflux disease, pneumonia, pneumothorax

, acute coronary syndrome just name a few.  Prior medical records were 

reviewed.  Chest x-ray was negative.  EKG was normal.  Patient's exam is 

consistent with tonsillitis we'll give prescription for Zithromax because 

patient is allergic to penicillin.Patient is advised to follow up with primary 

doctor in 2-3 days and return the emergency room for any worsening symptoms and 

as needed.


EKG Diagnostic Results


Rate:  normal


Rhythm:  NSR


ST Segments:  no acute changes





Rhythm Strip Diag. Results


EP Interpretation:  yes


Rate:  71


Rhythm:  NSR, no PVC's, no ectopy





Chest X-Ray Diagnostic Results


Chest X-Ray Diagnostic Results :  


   Chest X-Ray Ordered:  Yes


   # of Views/Limited/Complete:  1 View


   Indication:  Chest Pain


   EP Interpretation:  Yes


   Interpretation:  no consolidation, no effusion, no pneumothorax, no acute 

cardiopulmonary disease


   Impression:  No acute disease





Last Vital Signs








  Date Time  Temp Pulse Resp B/P (MAP) Pulse Ox O2 Delivery O2 Flow Rate FiO2


 


3/4/19 12:29 98.1 79 16 126/69 98 Room Air  








Status:  improved


Disposition:  HOME, SELF-CARE


Condition:  Stable


Scripts


Ranitidine Hcl* (ZANTAC*) 150 Mg Tablet


150 MG ORAL DAILY, #30 TAB 0 Refills


   Prov: Carlin Bartholomew MD         3/4/19 


Azithromycin* (ZITHROMAX*) 250 Mg Tablet


250 MG ORAL DAILY, #6 TAB 0 Refills


   Take two tables once daily for 1 day, then one tablet once daily for 4


   days.


   Prov: Carlin Bartholomew MD         3/4/19


Referrals:  


[facility] (PCP)


Patient Instructions:  Heartburn, Tonsillitis, Easy-to-Read











Carlin Bartholomew MD Mar 4, 2019 13:22

## 2019-04-29 ENCOUNTER — HOSPITAL ENCOUNTER (EMERGENCY)
Dept: HOSPITAL 72 - EMR | Age: 34
Discharge: HOME | End: 2019-04-29
Payer: COMMERCIAL

## 2019-04-29 VITALS — HEIGHT: 71 IN | WEIGHT: 160 LBS | BODY MASS INDEX: 22.4 KG/M2

## 2019-04-29 VITALS — SYSTOLIC BLOOD PRESSURE: 125 MMHG | DIASTOLIC BLOOD PRESSURE: 86 MMHG

## 2019-04-29 VITALS — DIASTOLIC BLOOD PRESSURE: 86 MMHG | SYSTOLIC BLOOD PRESSURE: 125 MMHG

## 2019-04-29 DIAGNOSIS — Z88.0: ICD-10-CM

## 2019-04-29 DIAGNOSIS — M87.9: ICD-10-CM

## 2019-04-29 DIAGNOSIS — Z76.0: ICD-10-CM

## 2019-04-29 DIAGNOSIS — M25.561: Primary | ICD-10-CM

## 2019-04-29 PROCEDURE — 99282 EMERGENCY DEPT VISIT SF MDM: CPT

## 2019-04-29 NOTE — EMERGENCY ROOM REPORT
History of Present Illness


General


Chief Complaint:  Pain


Source:  Patient





Present Illness


HPI


33-year-old male presents to the emergency department complaining of 9 out of 

10 in severity right knee pain to the point where he fell twice getting out of 

bed.  Patient reports history of avascular necrosis bilaterally he states he's 

never actually had hip pain he only has pain in the right knee and has had MRIs 

as well as x-rays performed and has appointment with a specialist on May 4.  

Patient states that he is all out of his meloxicam which has been working for 

him and states that he does on occasion will require taking tramadol for 

breakthrough pain and states that he is out of those as well.  Patient states 

that this is a newer diagnosis that he received in November of last year.  

Patient significant past medical history of HIV he denies fevers, chills, 

erythema warmth or swelling of the joints.  Patient denies recent illness, 

chest pain, dizziness, shortness of breath.  Patient states that due to the 

pain walking requires much more effort and he is noticing that he is fatigued 

and not interested in doing things that he used to do regularly due to the 

increase in effort required to complete certain tasks.  Denies night sweats, 

significant changes in weight or history of immune diseases.  Patient reports 

history of asthma states he did not take steroids very often in the past. Pt. 

reports he is still in the middle of being fully worked-up for the cause of his 

avascular necrosis. he denies recent trauma or fall. Reports several NVC's 

years ago but did not have symptoms immediately after. Pt. states he was doing 

physical therapy which was helping a lot, however he is awaiting insurance 

approval for additional treatments.


Allergies:  


Coded Allergies:  


     PENICILLINS (Unverified  Allergy, Unknown, 3/4/19)





Patient History


Past Medical History:  see triage record


Past Surgical History:  none


Pertinent Family History:  none


Reviewed Nursing Documentation:  PMH: Agreed; PSxH: Agreed





Nursing Documentation-PMH


Past Medical History:  No History, Except For


Hx Hypertension:  No


Hx Pacemaker:  No


Hx Asthma:  Yes


Hx COPD:  No


Hx Diabetes:  No


Hx Cancer:  No


Hx Gastrointestinal Problems:  No


Hx Dialysis:  No


Hx Neurological Problems:  No


Hx Cerebrovascular Accident:  No


Hx Seizures:  No





Review of Systems


All Other Systems:  negative except mentioned in HPI





Physical Exam





Vital Signs








  Date Time  Temp Pulse Resp B/P (MAP) Pulse Ox O2 Delivery O2 Flow Rate FiO2


 


4/29/19 13:01 97.9 100 20 125/86 97 Room Air  








Sp02 EP Interpretation:  reviewed, normal


General Appearance:  no apparent distress, alert, GCS 15, non-toxic


Head:  normocephalic, atraumatic


Eyes:  bilateral eye normal inspection, bilateral eye PERRL


ENT:  hearing grossly normal, normal voice


Neck:  full range of motion


Respiratory:  chest non-tender, lungs clear, normal breath sounds, speaking 

full sentences


Cardiovascular #1:  regular rate, rhythm, normal capillary refill


Cardiovascular #2:  2+ dorsalis pedis (R), 2+ dorsalis pedis (L)


Musculoskeletal:  back normal, gait/station normal - compensatory favoring the 

right leg. , normal range of motion, tender - TTP to the right lateral hip > 

left hip, FROM with pain, some ttp to the right knee as well. no erythmea, no 

warmth, no swelling or instability of the ligaments.


Neurologic:  alert, oriented x3, responsive, motor strength/tone normal, 

sensory intact, speech normal, grossly normal


Psychiatric:  judgement/insight normal


Skin:  normal color, no rash, warm/dry, well hydrated


Lymphatic:  no adenopathy





Medical Decision Making


PA Attestation


Dr. Vera is my supervising Physician whom patient management has been 

discussed with.


Diagnostic Impression:  


 Primary Impression:  


 Medication refill


 Additional Impression:  


 History of avascular necrosis of capital femoral epiphysis


ER Course


33-year-old male presents to the emergency department complaining of 9 out of 

10 in severity right knee pain to the point where he fell twice getting out of 

bed.  Patient reports history of avascular necrosis bilaterally he states he's 

never actually had hip pain he only has pain in the right knee and has had MRIs 

as well as x-rays performed and has appointment with a specialist on May 4.  

Patient states that he is all out of his meloxicam which has been working for 

him and states that he does on occasion will require taking tramadol for 

breakthrough pain and states that he is out of those as well.  Patient states 

that this is a newer diagnosis that he received in November of last year.  

Patient significant past medical history of HIV he denies fevers, chills, 

erythema warmth or swelling of the joints.  Patient denies recent illness, 

chest pain, dizziness, shortness of breath.  Patient states that due to the 

pain walking requires much more effort and he is noticing that he is fatigued 

and not interested in doing things that he used to do regularly due to the 

increase in effort required to complete certain tasks.  Denies night sweats, 

significant changes in weight or history of immune diseases.  Patient reports 

history of asthma states he did not take steroids very often in the past. Pt. 

reports he is still in the middle of being fully worked-up for the cause of his 

avascular necrosis. he denies recent trauma or fall. Reports several NVC's 

years ago but did not have symptoms immediately after. Pt. states he was doing 

physical therapy which was helping a lot, however he is awaiting insurance 

approval for additional treatments. Pt reports when he was dx'd with HIV he 

also was found to have G & C and was treated for that as well, and that was the 

only previous hx of G & C. 





Ddx considered but are not limited to: Septic joint, osteoporosis, MSK injury,  

urgent need for medication refill request, non -urgent medication refill 

request just to name a few. 





Vital signs: are WNL, pt. is afebrile





H&PE are most consistent with non- urgent need for medication refill. Pt. does 

not have evidence to suggest high probability of emergent complications 

resulting from the symptoms he is presenting with today. pt. is Alert,with 

Oriented, and able to make decisions.  Non-toxic in appearance, NAD. 





Pt. has imaging results from previous studies performed late 2018 which do show 

bilateral avascular necrosis. 





ORDERS: none required at this time, the diagnosis is clinical





ED INTERVENTIONS: None required at this time. 





Imaging was offered to patient to evaluate for any complications from recent 

falls but is declining, he reports he has follow up, and just needs a temporary 

refill of his pain medications until then. 





-I do not identify an emergent condition at this time. With current presentation

,  pt. is stable for close outpatient follow up and conservative treatment.  D/

w pt. to return promptly to ED with worsening or new symptoms.- Pt. verbalizes' 

understanding and agreement with proposed treatment plan.





DISCHARGE: At this time pt. is stable for d/c to home. Will provide printed 

patient care instructions, and any necessary prescriptions. Care plan and 

follow up instructions have been discussed with the patient prior to discharge.





Last Vital Signs








  Date Time  Temp Pulse Resp B/P (MAP) Pulse Ox O2 Delivery O2 Flow Rate FiO2


 


4/29/19 13:01 97.9 100 20 125/86 97 Room Air  








Disposition:  HOME, SELF-CARE


Condition:  Stable


Scripts


Tramadol Hcl* (ULTRAM*) 50 Mg Tablet


50 MG ORAL Q6H PRN for For Pain, #12 TAB 0 Refills


   Prov: Gilma Vargas         4/29/19 


Meloxicam* (MELOXICAM*) 15 Mg Tablet


15 MG PO DAILY for 14 Days, #14 TAB


   Prov: Gilma Vargas         4/29/19


Referrals:  


[facility] (PCP)


Patient Instructions:  Avascular Necrosis





Additional Instructions:  


Take medications as directed. 


 ** Follow up with a Primary Care Provider in 3-5 days, even if your symptoms 

have resolved. ** 


--Please review list of primary care clinics, if you do not already have a 

primary care provider





Return sooner to ED if new symptoms occur, or current symptoms become worse. 


Do not drink alcohol, drive, or operate heavy machinery while taking Tramadol 

as this may cause drowsiness. 











- Please note that this Emergency Department Report was dictated using [employer] technology software, occasionally this can lead to 

erroneous entry secondary to interpretation by the dictation equipment.











Gilma Vargas Apr 29, 2019 13:58

## 2019-11-19 ENCOUNTER — HOSPITAL ENCOUNTER (EMERGENCY)
Dept: HOSPITAL 72 - EMR | Age: 34
Discharge: HOME | End: 2019-11-19
Payer: COMMERCIAL

## 2019-11-19 VITALS — DIASTOLIC BLOOD PRESSURE: 89 MMHG | SYSTOLIC BLOOD PRESSURE: 135 MMHG

## 2019-11-19 VITALS — DIASTOLIC BLOOD PRESSURE: 75 MMHG | SYSTOLIC BLOOD PRESSURE: 129 MMHG

## 2019-11-19 VITALS — BODY MASS INDEX: 24.92 KG/M2 | HEIGHT: 71 IN | WEIGHT: 178 LBS

## 2019-11-19 DIAGNOSIS — J02.0: Primary | ICD-10-CM

## 2019-11-19 DIAGNOSIS — Z88.0: ICD-10-CM

## 2019-11-19 DIAGNOSIS — B20: ICD-10-CM

## 2019-11-19 DIAGNOSIS — R00.0: ICD-10-CM

## 2019-11-19 PROCEDURE — 99282 EMERGENCY DEPT VISIT SF MDM: CPT

## 2019-11-19 NOTE — EMERGENCY ROOM REPORT
History of Present Illness


General


Chief Complaint:  Sore Throat


Source:  Patient





Present Illness


HPI


34-year-old male presents to the emergency department complaining of 9 out of 

10 severity sore throat and pain exacerbation upon swallowing x 4 days.  

Patient also reports tonsillar swelling.  Reports history of immune compromise 

and states that he is HIV positive.  States he is not sure what his last CD4 

count was however his infectious disease doctor did not seem to be too 

concerned.  Patient reports fevers and chills on initial presentation he states 

he did not have any today.  He denies cough, neck stiffness, rhinorrhea or 

nasal congestion.  Patient states he did not yet receive this years flu 

vaccination.  No other aggravating or relieving factors at this time.  Denies 

changes in his voice.


Allergies:  


Coded Allergies:  


     PENICILLINS (Unverified  Allergy, Unknown, 3/4/19)





Patient History


Past Medical History:  see triage record, HIV


Past Surgical History:  none


Pertinent Family History:  none


Reviewed Nursing Documentation:  PMH: Agreed; PSxH: Agreed





Nursing Documentation-PMH


Past Medical History:  No History, Except For


Hx Hypertension:  No


Hx Pacemaker:  No


Hx Asthma:  Yes


Hx COPD:  No


Hx Diabetes:  No


Hx Cancer:  No


Hx Gastrointestinal Problems:  No


Hx Dialysis:  No


Hx Neurological Problems:  No


Hx Cerebrovascular Accident:  No


Hx Seizures:  No





Review of Systems


All Other Systems:  negative except mentioned in HPI





Physical Exam





Vital Signs








  Date Time  Temp Pulse Resp B/P (MAP) Pulse Ox O2 Delivery O2 Flow Rate FiO2


 


11/19/19 16:48 98.8 105 18 135/89 98 Room Air  








Sp02 EP Interpretation:  reviewed, normal


General Appearance:  no apparent distress, alert, GCS 15, non-toxic


Head:  normocephalic, atraumatic


Eyes:  bilateral eye normal inspection, bilateral eye PERRL


ENT:  hearing grossly normal, normal voice, TMs + canals normal, uvula midline, 

moist mucus membranes, tonsillar swelling, pharyngeal erythema, tonsillar 

exudate


Neck:  full range of motion


Respiratory:  chest non-tender, lungs clear, normal breath sounds, no 

respiratory distress, no wheezing, speaking full sentences


Cardiovascular #1:  regular rate, rhythm, tachycardia


Musculoskeletal:  gait/station normal, normal range of motion, non-tender


Neurologic:  alert, oriented x3, responsive, motor strength/tone normal, 

sensory intact, speech normal, grossly normal


Psychiatric:  judgement/insight normal


Skin:  no rash


Lymphatic:  no adenopathy





Medical Decision Making


PA Attestation


Dr. Goodson Is my supervising Physician whom patient management has been 

discussed with.


Diagnostic Impression:  


 Primary Impression:  


 Pharyngitis, acute


 Qualified Codes:  J02.0 - Streptococcal pharyngitis


ER Course


34-year-old male presents to the emergency department complaining of 9 out of 

10 severity sore throat and pain exacerbation upon swallowing x 4 days.  

Patient also reports tonsillar swelling.  Reports history of immune compromise 

and states that he is HIV positive.  States he is not sure what his last CD4 

count was however his infectious disease doctor did not seem to be too 

concerned.  Patient reports fevers and chills on initial presentation he states 

he did not have any today.  He denies cough, neck stiffness, rhinorrhea or 

nasal congestion.  Patient states he did not yet receive this years flu 

vaccination.  No other aggravating or relieving factors at this time.  Denies 

changes in his voice.





Ddx considered but are not limited to: pharyngitis, strep, PTA, ludwigs angina, 

URI 


Vital signs: Tachycardic other vs are WNL, pt. is afebrile


 


H&PE are most consistent with: pharyngitis presumed strep. 





ORDERS: None required at this time as the diagnosis is clinical 





ED INTERVENTIONS: none required at this time. 





DISCHARGE: At this time pt. is stable for d/c to home. Will provide printed 

patient care instructions, and any necessary prescriptions. Care plan and 

follow up instructions have been discussed with the patient prior to discharge.





Last Vital Signs








  Date Time  Temp Pulse Resp B/P (MAP) Pulse Ox O2 Delivery O2 Flow Rate FiO2


 


11/19/19 16:48 98.8 105 18 135/89 (104) 98 Room Air  








Disposition:  HOME, SELF-CARE


Condition:  Stable


Patient Instructions:  Tonsillitis





Additional Instructions:  


Take medications as directed. 





 ** Follow up with a Primary Care Provider in 3-5 days, even if your symptoms 

have resolved. ** 





Return sooner to ED if new symptoms occur, or current symptoms become worse. 











- Please note that this Emergency Department Report was dictated using [employer] technology software, occasionally this can lead to 

erroneous entry secondary to interpretation by the dictation equipment.











Gilma Vargas Nov 19, 2019 17:25

## 2020-11-09 ENCOUNTER — HOSPITAL ENCOUNTER (EMERGENCY)
Dept: HOSPITAL 72 - EMR | Age: 35
Discharge: HOME | End: 2020-11-09
Payer: COMMERCIAL

## 2020-11-09 VITALS — HEIGHT: 71 IN | BODY MASS INDEX: 24.92 KG/M2 | WEIGHT: 178 LBS

## 2020-11-09 VITALS — SYSTOLIC BLOOD PRESSURE: 126 MMHG | DIASTOLIC BLOOD PRESSURE: 81 MMHG

## 2020-11-09 VITALS — DIASTOLIC BLOOD PRESSURE: 81 MMHG | SYSTOLIC BLOOD PRESSURE: 126 MMHG

## 2020-11-09 DIAGNOSIS — K64.9: Primary | ICD-10-CM

## 2020-11-09 DIAGNOSIS — J45.909: ICD-10-CM

## 2020-11-09 PROCEDURE — 99282 EMERGENCY DEPT VISIT SF MDM: CPT

## 2020-11-10 NOTE — EMERGENCY ROOM REPORT
History of Present Illness


General


Chief Complaint:  General Complaint


Source:  Patient





Present Illness


HPI


35-year-old male presents to ED for evaluation.  His blood in stool when he 

wipes for the last 2 days.  Has pain.  States that he was using an enema 

previously because he was unable to have a bowel movement.  Denies taking blood 

thinners.  Denies any abdominal pain.  Denies any nausea or vomiting.  No other 

aggravating relieving factors.  Denies any other associated symptoms


Allergies:  


Coded Allergies:  


     PENICILLINS (Unverified  Allergy, Unknown, 3/4/19)





COVID-19 Screening


Contact w/high risk pt:  No


Experienced COVID-19 symptoms?:  No


COVID-19 Testing performed PTA:  No





Patient History


Past Medical History:  asthma


Past Surgical History:  none


Pertinent Family History:  none


Social History:  Denies: smoking, alcohol use, drug use


Immunizations:  UTD


Reviewed Nursing Documentation:  PMH: Agreed; PSxH: Agreed





Nursing Documentation-PMH


Past Medical History:  No History, Except For


Hx Hypertension:  No


Hx Pacemaker:  No


Hx Asthma:  Yes


Hx COPD:  No


Hx Diabetes:  No


Hx Cancer:  No


Hx Gastrointestinal Problems:  No


Hx Dialysis:  No


Hx Neurological Problems:  No


Hx Cerebrovascular Accident:  No


Hx Seizures:  No





Review of Systems


All Other Systems:  negative except mentioned in HPI





Physical Exam





Vital Signs








  Date Time  Temp Pulse Resp B/P (MAP) Pulse Ox O2 Delivery O2 Flow Rate FiO2


 


11/9/20 13:21 98.1 79 16 126/81 (96) 100 Room Air  








Sp02 EP Interpretation:  reviewed, normal


General Appearance:  no apparent distress, alert, GCS 15, non-toxic


Head:  normocephalic, atraumatic


Eyes:  bilateral eye normal inspection, bilateral eye PERRL


ENT:  hearing grossly normal, normal pharynx, no angioedema, normal voice


Neck:  full range of motion, supple/symm/no masses


Respiratory:  chest non-tender, lungs clear, normal breath sounds, speaking full

sentences


Cardiovascular #1:  regular rate, rhythm, no edema


Cardiovascular #2:  2+ carotid (R), 2+ carotid (L), 2+ radial (R), 2+ radial 

(L), 2+ dorsalis pedis (R), 2+ dorsalis pedis (L)


Gastrointestinal:  normal bowel sounds, non tender, soft, non-distended, no 

guarding, no rebound


Rectal:  hemorrhoids


Genitourinary:  normal inspection, no CVA tenderness


Musculoskeletal:  back normal, normal range of motion, gait/station normal, non-

tender


Neurologic:  alert, motor strength/tone normal, oriented x3, sensory intact, 

responsive, speech normal


Psychiatric:  judgement/insight normal, memory normal, mood/affect normal, no 

suicidal/homicidal ideation


Reflexes:  3+ bicep (R), 3+ bicep (L), 3+ tricep (R), 3+ tricep (L), 3+ knee 

(R), 3+ knee (L)


Lymphatic:  no adenopathy





Medical Decision Making


Diagnostic Impression:  


   Primary Impression:  


   Hemorrhoids


   Qualified Codes:  K64.9 - Unspecified hemorrhoids


ER Course


Hospital Course 


35 year-old male presenting to ED complaining of rectal bleeding, he has been 

constipated





Differential diagnoses include: internal hemorrhoids, external hemorrhoids, anal

fissure, constipation





Clinical course


Patient placed on stretcher in ED.  After initial history physical exam reveals 

a young male in no acute distress.  Upon rectal exam there is good rectal tone 

there is no gross blood. There is fullness on rectal exam with tenderness 

consistent with an internal hemorrhoid. 





I discussed findings with patient.  Reassurance given.  Explained that 

hemorrhoids come with constipation.  Will treat both.  Safe for discharge for 

close outpatient follow-up





Diagnosis - hemorrhoids 





Stable and discharged to home with prescription for Anusol suppository, Colace. 

Patient instructed to followup with PMD.  Patient instructed to return to ED if 

symptoms recur or worsen





Last Vital Signs








  Date Time  Temp Pulse Resp B/P (MAP) Pulse Ox O2 Delivery O2 Flow Rate FiO2


 


11/9/20 14:00 98.1 76 16 126/81 100 Room Air  








Status:  improved


Disposition:  HOME, SELF-CARE


Condition:  Stable


Scripts


Docusate Sodium* (COLACE*) 100 Mg Capsule


100 MG ORAL THREE TIMES A DAY, #30 CAP


   Prov: Tomer Beatty MD         11/9/20 


Hydrocortisone Acetate* (ANUSOL-HC*) 25 Mg Supp.rect


1 SUPP RECTAL TWICE A DAY, #10 SUPP


   Prov: Tomer Beatty MD         11/9/20


Referrals:  


[facility] (PCP)











[facility]


Patient Instructions:  Hemorrhoids, Easy-to-Read











Tomer Beatty MD            Nov 10, 2020 11:36

## 2020-12-17 ENCOUNTER — HOSPITAL ENCOUNTER (EMERGENCY)
Dept: HOSPITAL 72 - EMR | Age: 35
Discharge: HOME | End: 2020-12-17
Payer: COMMERCIAL

## 2020-12-17 VITALS — DIASTOLIC BLOOD PRESSURE: 85 MMHG | SYSTOLIC BLOOD PRESSURE: 114 MMHG

## 2020-12-17 VITALS — HEIGHT: 71 IN | WEIGHT: 170 LBS | BODY MASS INDEX: 23.8 KG/M2

## 2020-12-17 VITALS — SYSTOLIC BLOOD PRESSURE: 114 MMHG | DIASTOLIC BLOOD PRESSURE: 80 MMHG

## 2020-12-17 DIAGNOSIS — J45.909: ICD-10-CM

## 2020-12-17 DIAGNOSIS — Z20.2: Primary | ICD-10-CM

## 2020-12-17 DIAGNOSIS — Z88.0: ICD-10-CM

## 2020-12-17 DIAGNOSIS — B20: ICD-10-CM

## 2020-12-17 LAB
APPEARANCE UR: CLEAR
APTT PPP: YELLOW S
GLUCOSE UR STRIP-MCNC: NEGATIVE MG/DL
KETONES UR QL STRIP: NEGATIVE
LEUKOCYTE ESTERASE UR QL STRIP: NEGATIVE
NITRITE UR QL STRIP: NEGATIVE
PH UR STRIP: 8 [PH] (ref 4.5–8)
PROT UR QL STRIP: NEGATIVE
SP GR UR STRIP: 1.01 (ref 1–1.03)
UROBILINOGEN UR-MCNC: 8 MG/DL (ref 0–1)

## 2020-12-17 PROCEDURE — 96372 THER/PROPH/DIAG INJ SC/IM: CPT

## 2020-12-17 PROCEDURE — 81003 URINALYSIS AUTO W/O SCOPE: CPT

## 2020-12-17 PROCEDURE — 87590 N.GONORRHOEAE DNA DIR PROB: CPT

## 2020-12-17 PROCEDURE — 99283 EMERGENCY DEPT VISIT LOW MDM: CPT

## 2020-12-17 PROCEDURE — 87491 CHLMYD TRACH DNA AMP PROBE: CPT

## 2020-12-17 NOTE — NUR
ED Nurse Note: pt wlaked in from home. Vitals signs stable on RA as documented. 
PT states that he has had painful sores on the right side of the hard palate of 
his mouth. He states that they began 3 days ago. He also states that it "burns 
when i pee" taht started about a week ago.

## 2020-12-17 NOTE — EMERGENCY ROOM REPORT
History of Present Illness


General


Chief Complaint:  Sore Throat


Source:  Patient





Present Illness


HPI


Patient with presents with sore throat.  This is developed over the last few 

days.  In addition to that he has had dysuria.  There is been no pus.  There is 

been no hematuria.  He denies any fever chills or adenopathy.  He states that 

his partner recently tested positive for chlamydia.  He is concerned that he 

might have chlamydia at this time both in the urethra and also in his mouth.  

The patient is HIV positive.





Patient denies exposure to Covid positive contacts.


Allergies:  


Coded Allergies:  


     PENICILLINS (Unverified  Allergy, Unknown, 3/4/19)





COVID-19 Screening


Contact w/high risk pt:  No


Experienced COVID-19 symptoms?:  No


COVID-19 Testing performed PTA:  Yes


COVID-19 Screening:  Negative COVID-19


COVID-19 Testing Source:  nasal





Patient History


Past Medical History:  old chart reviewed, asthma, HIV, other - Avascular 

necrosis of the hip


Social History:  Denies: smoking


Social History Narrative


From home


Reviewed Nursing Documentation:  PMH: Agreed; PSxH: Agreed





Nursing Documentation-PMH


Past Medical History:  No History, Except For


Hx Hypertension:  No


Hx Pacemaker:  No


Hx Asthma:  Yes


Hx COPD:  No


Hx Diabetes:  No


Hx Cancer:  No


Hx Gastrointestinal Problems:  No


Hx Dialysis:  No


Hx Neurological Problems:  No


Hx Cerebrovascular Accident:  No


Hx Seizures:  No





Review of Systems


Constitutional:  Denies: fever


ENT:  Reports: see HPI


Respiratory:  Denies: cough, shortness of breath


Cardiovascular:  Denies: chest pain


Gastrointestinal:  Denies: diarrhea


Genitourinary:  Reports: see HPI


Musculoskeletal:  Denies: joint pain


Skin:  Denies: rash


Hematologic/Lymphatic:  Reports: see HPI





Physical Exam





Vital Signs








  Date Time  Temp Pulse Resp B/P (MAP) Pulse Ox O2 Delivery O2 Flow Rate FiO2


 


12/17/20 12:13 98.1 96 16 131/83 (99) 99 Room Air  








Sp02 EP Interpretation:  reviewed, normal


General Appearance:  well appearing, no apparent distress, GCS 15


Head:  normocephalic


Eyes:  bilateral eye normal inspection, bilateral eye PERRL


ENT:  no angioedema, moist mucus membranes, pharyngeal erythema


Neck:  normal inspection, full range of motion


Respiratory:  normal inspection


Cardiovascular #1:  regular rate, rhythm


Cardiovascular #2:  2+ radial (R)


Gastrointestinal:  normal inspection


Genitourinary:  normal inspection


Musculoskeletal:  other - Walks with a limp


Neurologic:  alert, oriented x3, grossly normal


Psychiatric:  mood/affect normal


Skin:  normal color, no rash, other - Fully dressed


Lymphatic:  normal inspection, other - no lymphadenopathy





Medical Decision Making


Diagnostic Impression:  


   Primary Impression:  


   Possible exposure to STD


ER Course


Patient presents with sore throat and dysuria after possible exposure to 

chlamydia.  Differential includes chlamydia, gonorrhea, pharyngitis, urinary t

ract infection amongst others.  Urinalysis will be sent along with chlamydia and

GC titers.  The patient will be treated with Rocephin and azithromycin.





Urinalysis clear.





Discussed findings with patient and the need for safe sex practices.





Patient stable for outpatient observation and treatment.





Laboratory Tests








Test


 12/17/20


13:20


 


Urine Color Yellow  


 


Urine Appearance Clear  


 


Urine pH 8 (4.5-8.0)  


 


Urine Specific Gravity


 1.010


(1.005-1.035)


 


Urine Protein


 Negative


(NEGATIVE)


 


Urine Glucose (UA)


 Negative


(NEGATIVE)


 


Urine Ketones


 Negative


(NEGATIVE)


 


Urine Blood


 Negative


(NEGATIVE)


 


Urine Nitrite


 Negative


(NEGATIVE)


 


Urine Bilirubin


 Negative


(NEGATIVE)


 


Urine Urobilinogen


 8 MG/DL


(0.0-1.0)  H


 


Urine Leukocyte Esterase


 Negative


(NEGATIVE)











Last Vital Signs








  Date Time  Temp Pulse Resp B/P (MAP) Pulse Ox O2 Delivery O2 Flow Rate FiO2


 


12/17/20 15:00 98.6 85 18 114/85 99 Room Air  








Status:  improved


Disposition:  HOME, SELF-CARE


Condition:  Improved


Referrals:  


[facility] (PCP)











Agus Vera MD                Dec 17, 2020 14:32